# Patient Record
Sex: MALE | Race: WHITE | NOT HISPANIC OR LATINO | Employment: OTHER | URBAN - METROPOLITAN AREA
[De-identification: names, ages, dates, MRNs, and addresses within clinical notes are randomized per-mention and may not be internally consistent; named-entity substitution may affect disease eponyms.]

---

## 2017-10-03 ENCOUNTER — OFFICE VISIT (OUTPATIENT)
Dept: URGENT CARE | Facility: CLINIC | Age: 72
End: 2017-10-03
Payer: MEDICARE

## 2017-10-03 PROCEDURE — G0463 HOSPITAL OUTPT CLINIC VISIT: HCPCS

## 2017-10-03 PROCEDURE — 99213 OFFICE O/P EST LOW 20 MIN: CPT

## 2017-10-05 NOTE — PROGRESS NOTES
Assessment  1  Sciatica of right side (724 3) (M54 31)    Plan  Sciatica of right side    · Baclofen 10 MG Oral Tablet; TAKE 1 TABLET 3 TIMES DAILY AS NEEDED FOR  MUSCLE SPASM   · PredniSONE 10 MG Oral Tablet; 3 tabs BID X 2 days, 2 Tabs BID X 2 days, 1 Tab  BID X 2 Days, then 1 Tab daily X 2 days    Discussion/Summary  Discussion Summary:   Start prednisone and baclofen as needed  If symptoms are not improving over the next week, follow up with PCP  Medication Side Effects Reviewed: Possible side effects of new medications were reviewed with the patient/guardian today  Understands and agrees with treatment plan: The treatment plan was reviewed with the patient/guardian  The patient/guardian understands and agrees with the treatment plan   Follow Up Instructions: Follow Up with your Primary Care Provider in 7 days  If your symptoms worsen, go to the nearest [place] [address] Emergency Department  Chief Complaint  1  Back Pain  Chief Complaint Free Text Note Form: C/O right low back pain which he describes as shooting and radiates into his right leg x 3 days  Pt denies any known injury  History of Present Illness  HPI: 80year old male here with right lower back pain that radiates into his right hip and buttocks  Denies any injury  Denies weakness or numbness in his leg  Denies bowel or bladder symptoms  No Fever or chills  Hospital Based Practices Required Assessment:   Pain Assessment   the patient states they have pain  The pain is located in the right low back  The pain radiates to the right leg  The patient describes the pain as sharp and shooting  (on a scale of 0 to 10, the patient rates the pain at 7 )   Abuse And Domestic Violence Screen    Yes, the patient is safe at home -The patient states no one is hurting them  Depression And Suicide Screen  No, the patient has not had thoughts of hurting themself  No, the patient has not felt depressed in the past 7 days     Back Pain: RUTH ANN GUIDRY presents with complaints of back pain  Associated symptoms include spasm, but-no stiffness,-no fever,-no night sweats,-no abdominal pain,-no general malaise,-no weight loss,-no arm numbness,-no leg numbness,-no arm weakness,-no leg weakness,-no urinary incontinence,-no fecal incontinence,-no urinary retention-and-no rash localized to the area of pain  Review of Systems  Focused-Male:   Constitutional: no fever or chills, feels well, no tiredness, no recent weight loss or weight gain  Respiratory: no complaints of shortness of breath, no wheezing or cough, no dyspnea on exertion, no orthopnea or PND  Gastrointestinal: no complaints of abdominal pain, no constipation, no nausea or vomiting, no diarrhea or bloody stools  Genitourinary: no complaints of dysuria or incontinence, no hesitancy, no nocturia, no genital lesion, no inadequacy of penile erection  Musculoskeletal: as noted in HPI  Neurological: no complaints of headache, no confusion, no numbness or tingling, no dizziness or fainting  ROS Reviewed:   ROS reviewed  Active Problems  1  Epistaxis (784 7) (R04 0)   2  Heart disease (429 9) (I51 9)   3  Hyperlipidemia (272 4) (E78 5)   4  Hypertension (401 9) (I10)    Past Medical History  Active Problems And Past Medical History Reviewed: The active problems and past medical history were reviewed and updated today  Family History  Family History Reviewed: The family history was reviewed and updated today  Social History  Social History Reviewed: The social history was reviewed and updated today  The social history was reviewed and is unchanged  Surgical History  1  History of Heart Surgery  Surgical History Reviewed: The surgical history was reviewed and updated today  Current Meds   1  ALPRAZolam 0 25 MG Oral Tablet; Therapy: 45WVN8227 to (Last Rx:13Mar2012)  Requested for: 84KXW2568 Ordered   2  Aspirin 81 MG TABS;    Therapy: (Recorded:22Nov2014) to Recorded 3  Chantix Continuing Month Tyler 1 MG Oral Tablet; Therapy: 48TJW6578 to (Last Rx:09Jan2012)  Requested for: 32DZI5129 Ordered   4  Crestor TABS; Therapy: (([phone]) to Recorded   5  Isosorbide Mononitrate ER 30 MG Oral Tablet Extended Release 24 Hour; Therapy: 80SUY8175 to (Last Rx:16Mar2012)  Requested for: 26AUZ4925 Ordered   6  Lisinopril 5 MG Oral Tablet; Therapy: 70SNE9872 to (Last Rx:13Mar2012)  Requested for: 22CDM8797 Ordered   7  Meloxicam 15 MG Oral Tablet; Therapy: 34QCY6414 to (Last Rx:13Mar2012)  Requested for: 59CGD5984 Ordered   8  Metoprolol Tartrate 50 MG Oral Tablet; Therapy: 09ONB0574 to (Last Rx:13Mar2012)  Requested for: 16NZT3807 Ordered   9  Plavix 75 MG Oral Tablet; Therapy: 75JLV5947 to (Last Rx:13Mar2012)  Requested for: 66KGX7207 Ordered   10  Voltaren 1 % Transdermal Gel; Therapy: 41GNT4253 to (Last Rx:12Oct2011)  Requested for: 29Gbu4692 Ordered   11  Zetia 10 MG Oral Tablet; Therapy: 35YRP1552 to (Last Rx:13Mar2012)  Requested for: 89NWD9168 Ordered  Medication List Reviewed: The medication list was reviewed and updated today  Allergies  1  No Known Drug Allergies    Vitals  Signs   Recorded: 32DHM0437 11:55AM   Temperature: 95 9 F  Heart Rate: 77  Respiration: 18  Systolic: 955  Diastolic: 82  O2 Saturation: 96  Pain Scale: 7    Physical Exam    Constitutional   General appearance: No acute distress, well appearing and well nourished  Musculoskeletal   Gait and station: Normal     Inspection/palpation of joints, bones, and muscles: Abnormal  -lumbar spine with limited ROM of lower back, tender to palpation right paraspinal muscles  Good strength B/L LE 5/5, DTRs +2 B/L patella and ankle, sensation intact        Signatures   Electronically signed by : Cirilo Hobbs, [facility]; Oct  3 2017 12:10PM EST                       (Author)    Electronically signed by : JOSE Dobson ; Oct  4 2017 12:31PM EST                       (Co-author)

## 2019-06-12 ENCOUNTER — APPOINTMENT (OUTPATIENT)
Dept: LAB | Facility: CLINIC | Age: 74
End: 2019-06-12
Payer: MEDICARE

## 2019-06-12 ENCOUNTER — TRANSCRIBE ORDERS (OUTPATIENT)
Dept: URGENT CARE | Facility: CLINIC | Age: 74
End: 2019-06-12

## 2019-06-12 DIAGNOSIS — Z12.5 SCREENING FOR PROSTATE CANCER: ICD-10-CM

## 2019-06-12 DIAGNOSIS — E55.9 VITAMIN D DEFICIENCY: ICD-10-CM

## 2019-06-12 DIAGNOSIS — E78.5 HYPERLIPIDEMIA, UNSPECIFIED HYPERLIPIDEMIA TYPE: Primary | ICD-10-CM

## 2019-06-12 DIAGNOSIS — I10 ESSENTIAL HYPERTENSION: ICD-10-CM

## 2019-06-12 LAB
25(OH)D3 SERPL-MCNC: 25.2 NG/ML (ref 30–100)
ALBUMIN SERPL BCP-MCNC: 4.1 G/DL (ref 3.5–5)
ALP SERPL-CCNC: 86 U/L (ref 46–116)
ALT SERPL W P-5'-P-CCNC: 22 U/L (ref 12–78)
ANION GAP SERPL CALCULATED.3IONS-SCNC: 4 MMOL/L (ref 4–13)
AST SERPL W P-5'-P-CCNC: 16 U/L (ref 5–45)
BASOPHILS # BLD AUTO: 0.03 THOUSANDS/ΜL (ref 0–0.1)
BASOPHILS NFR BLD AUTO: 0 % (ref 0–1)
BILIRUB SERPL-MCNC: 0.68 MG/DL (ref 0.2–1)
BILIRUB UR QL STRIP: NEGATIVE
BUN SERPL-MCNC: 20 MG/DL (ref 5–25)
CALCIUM SERPL-MCNC: 8.8 MG/DL (ref 8.3–10.1)
CHLORIDE SERPL-SCNC: 105 MMOL/L (ref 100–108)
CHOLEST SERPL-MCNC: 135 MG/DL (ref 50–200)
CLARITY UR: CLEAR
CO2 SERPL-SCNC: 26 MMOL/L (ref 21–32)
COLOR UR: YELLOW
CREAT SERPL-MCNC: 1.11 MG/DL (ref 0.6–1.3)
CREAT UR-MCNC: 118 MG/DL
EOSINOPHIL # BLD AUTO: 0.1 THOUSAND/ΜL (ref 0–0.61)
EOSINOPHIL NFR BLD AUTO: 1 % (ref 0–6)
ERYTHROCYTE [DISTWIDTH] IN BLOOD BY AUTOMATED COUNT: 12.9 % (ref 11.6–15.1)
GFR SERPL CREATININE-BSD FRML MDRD: 66 ML/MIN/1.73SQ M
GLUCOSE SERPL-MCNC: 95 MG/DL (ref 65–140)
GLUCOSE UR STRIP-MCNC: NEGATIVE MG/DL
HCT VFR BLD AUTO: 47.8 % (ref 36.5–49.3)
HDLC SERPL-MCNC: 31 MG/DL (ref 40–60)
HGB BLD-MCNC: 15.9 G/DL (ref 12–17)
HGB UR QL STRIP.AUTO: NEGATIVE
IMM GRANULOCYTES # BLD AUTO: 0.04 THOUSAND/UL (ref 0–0.2)
IMM GRANULOCYTES NFR BLD AUTO: 1 % (ref 0–2)
KETONES UR STRIP-MCNC: NEGATIVE MG/DL
LDLC SERPL CALC-MCNC: 71 MG/DL (ref 0–100)
LEUKOCYTE ESTERASE UR QL STRIP: NEGATIVE
LYMPHOCYTES # BLD AUTO: 1.71 THOUSANDS/ΜL (ref 0.6–4.47)
LYMPHOCYTES NFR BLD AUTO: 23 % (ref 14–44)
MCH RBC QN AUTO: 32.1 PG (ref 26.8–34.3)
MCHC RBC AUTO-ENTMCNC: 33.3 G/DL (ref 31.4–37.4)
MCV RBC AUTO: 97 FL (ref 82–98)
MICROALBUMIN UR-MCNC: 6.7 MG/L (ref 0–20)
MICROALBUMIN/CREAT 24H UR: 6 MG/G CREATININE (ref 0–30)
MONOCYTES # BLD AUTO: 0.87 THOUSAND/ΜL (ref 0.17–1.22)
MONOCYTES NFR BLD AUTO: 12 % (ref 4–12)
NEUTROPHILS # BLD AUTO: 4.81 THOUSANDS/ΜL (ref 1.85–7.62)
NEUTS SEG NFR BLD AUTO: 63 % (ref 43–75)
NITRITE UR QL STRIP: NEGATIVE
NONHDLC SERPL-MCNC: 104 MG/DL
NRBC BLD AUTO-RTO: 0 /100 WBCS
PH UR STRIP.AUTO: 6.5 [PH]
PLATELET # BLD AUTO: 150 THOUSANDS/UL (ref 149–390)
PMV BLD AUTO: 13.1 FL (ref 8.9–12.7)
POTASSIUM SERPL-SCNC: 4.2 MMOL/L (ref 3.5–5.3)
PROT SERPL-MCNC: 8.1 G/DL (ref 6.4–8.2)
PROT UR STRIP-MCNC: NEGATIVE MG/DL
RBC # BLD AUTO: 4.95 MILLION/UL (ref 3.88–5.62)
SODIUM SERPL-SCNC: 135 MMOL/L (ref 136–145)
SP GR UR STRIP.AUTO: 1.02 (ref 1–1.03)
TRIGL SERPL-MCNC: 163 MG/DL
UROBILINOGEN UR QL STRIP.AUTO: 1 E.U./DL
WBC # BLD AUTO: 7.56 THOUSAND/UL (ref 4.31–10.16)

## 2019-06-12 PROCEDURE — 80061 LIPID PANEL: CPT

## 2019-06-12 PROCEDURE — 85025 COMPLETE CBC W/AUTO DIFF WBC: CPT

## 2019-06-12 PROCEDURE — 82043 UR ALBUMIN QUANTITATIVE: CPT

## 2019-06-12 PROCEDURE — 82570 ASSAY OF URINE CREATININE: CPT

## 2019-06-12 PROCEDURE — 36415 COLL VENOUS BLD VENIPUNCTURE: CPT

## 2019-06-12 PROCEDURE — 80053 COMPREHEN METABOLIC PANEL: CPT

## 2019-06-12 PROCEDURE — 81003 URINALYSIS AUTO W/O SCOPE: CPT

## 2019-06-12 PROCEDURE — 82306 VITAMIN D 25 HYDROXY: CPT

## 2019-06-12 PROCEDURE — G0103 PSA SCREENING: HCPCS

## 2019-06-13 LAB
PSA FREE MFR SERPL: 15.9 %
PSA FREE SERPL-MCNC: 0.27 NG/ML
PSA SERPL-MCNC: 1.7 NG/ML (ref 0–4)

## 2019-09-18 ENCOUNTER — APPOINTMENT (OUTPATIENT)
Dept: RADIOLOGY | Facility: CLINIC | Age: 74
End: 2019-09-18
Payer: MEDICARE

## 2019-09-18 ENCOUNTER — OFFICE VISIT (OUTPATIENT)
Dept: NEPHROLOGY | Facility: CLINIC | Age: 74
End: 2019-09-18
Payer: MEDICARE

## 2019-09-18 VITALS
BODY MASS INDEX: 29.09 KG/M2 | HEART RATE: 76 BPM | SYSTOLIC BLOOD PRESSURE: 140 MMHG | WEIGHT: 181 LBS | DIASTOLIC BLOOD PRESSURE: 82 MMHG | HEIGHT: 66 IN

## 2019-09-18 DIAGNOSIS — M25.512 LEFT SHOULDER PAIN, UNSPECIFIED CHRONICITY: Primary | ICD-10-CM

## 2019-09-18 DIAGNOSIS — M25.512 LEFT SHOULDER PAIN, UNSPECIFIED CHRONICITY: ICD-10-CM

## 2019-09-18 PROCEDURE — 99213 OFFICE O/P EST LOW 20 MIN: CPT | Performed by: INTERNAL MEDICINE

## 2019-09-18 PROCEDURE — 73030 X-RAY EXAM OF SHOULDER: CPT

## 2019-09-18 RX ORDER — ISOSORBIDE MONONITRATE 30 MG/1
TABLET, EXTENDED RELEASE ORAL
COMMUNITY
Start: 2012-03-16

## 2019-09-18 RX ORDER — ALPRAZOLAM 0.25 MG/1
TABLET ORAL
COMMUNITY
Start: 2012-03-13

## 2019-09-18 RX ORDER — TRAMADOL HYDROCHLORIDE 50 MG/1
50 TABLET ORAL
Qty: 30 TABLET | Refills: 0 | Status: SHIPPED | OUTPATIENT
Start: 2019-09-18

## 2019-09-18 RX ORDER — LISINOPRIL 10 MG/1
TABLET ORAL
COMMUNITY
Start: 2019-08-29 | End: 2019-11-25 | Stop reason: SDUPTHER

## 2019-09-18 RX ORDER — EZETIMIBE 10 MG/1
TABLET ORAL
COMMUNITY
Start: 2012-03-13

## 2019-09-18 RX ORDER — CLOPIDOGREL BISULFATE 75 MG/1
TABLET ORAL
COMMUNITY
Start: 2012-03-13 | End: 2019-11-25 | Stop reason: SDUPTHER

## 2019-09-18 NOTE — PATIENT INSTRUCTIONS
Use ice or heat  Have an xray  Take tramadol 50mg with 2 extra strength tylenol at bedtime for pain  See Dr Du Loud

## 2019-09-18 NOTE — PROGRESS NOTES
[address] [facility], [place]    Name: Joseph Jackson  YOB: 1945      Assessment/Plan:    No problem-specific Assessment & Plan notes found for this encounter  Problem List Items Addressed This Visit        Other    Shoulder pain, left - Primary    Relevant Medications    traMADol (ULTRAM) 50 mg tablet    Other Relevant Orders    XR shoulder 2+ vw left    Ambulatory referral to Orthopedic Surgery            Subjective:      Patient ID: Joseph Jackson is a 68 y o  male  HPI  He fell out of bed and injured his left shoulder  He did not go to [facility]  This happened one month ago and it is not improving  He can't lift his left arm more than 30- degrees without pain  He can flex behind his back He can touch the top of his head with forcing  He tried heat, ice and both  He can't sleep as the pain is worse at night  He gets a heating pain and aches all over  To lower arm and the left chest   He is able to hdo activities but he hears a clicking sound  He just tried Aleve q 12 h and Blue Emu with minimal relief  The following portions of the patient's history were reviewed and updated as appropriate: allergies, current medications, past family history, past medical history, past social history, past surgical history and problem list     Review of Systems   All other systems reviewed and are negative          Social History     Socioeconomic History    Marital status: /Civil Union     Spouse name: None    Number of children: None    Years of education: None    Highest education level: None   Occupational History    None   Social Needs    Financial resource strain: None    Food insecurity:     Worry: None     Inability: None    Transportation needs:     Medical: None     Non-medical: None   Tobacco Use    Smoking status: Former Smoker    Smokeless tobacco: Never Used   Substance and Sexual Activity    Alcohol use: Never     Frequency: Never    Drug use: Never    Sexual activity: None   Lifestyle    Physical activity:     Days per week: None     Minutes per session: None    Stress: None   Relationships    Social connections:     Talks on phone: None     Gets together: None     Attends Religion service: None     Active member of club or organization: None     Attends meetings of clubs or organizations: None     Relationship status: None    Intimate partner violence:     Fear of current or ex partner: None     Emotionally abused: None     Physically abused: None     Forced sexual activity: None   Other Topics Concern    None   Social History Narrative    None     Past Medical History:   Diagnosis Date    Chronic ischemic heart disease     COPD (chronic obstructive pulmonary disease) ([facility] [address] )     Hyperlipemia     Hypertension     Osteoarthritis      Past Surgical History:   Procedure Laterality Date    ANGIOPLASTY      APPENDECTOMY         Current Outpatient Medications:     ALPRAZolam (XANAX) 0 25 mg tablet, Take by mouth, Disp: , Rfl:     clopidogrel (PLAVIX) 75 mg tablet, Take by mouth, Disp: , Rfl:     diclofenac sodium (VOLTAREN) 1 %, Place on the skin, Disp: , Rfl:     ezetimibe (ZETIA) 10 mg tablet, Take by mouth, Disp: , Rfl:     isosorbide mononitrate (IMDUR) 30 mg 24 hr tablet, Take by mouth, Disp: , Rfl:     lisinopril (ZESTRIL) 10 mg tablet, , Disp: , Rfl:     traMADol (ULTRAM) 50 mg tablet, Take 1 tablet (50 mg total) by mouth daily at bedtime, Disp: 30 tablet, Rfl: 0     Lab Results   Component Value Date    SODIUM 135 (L) 06/12/2019    K 4 2 06/12/2019     06/12/2019    CO2 26 06/12/2019    AGAP 4 06/12/2019    BUN 20 06/12/2019    CREATININE 1 11 06/12/2019    GLUC 95 06/12/2019    CALCIUM 8 8 06/12/2019    AST 16 06/12/2019    ALT 22 06/12/2019    ALKPHOS 86 06/12/2019    TP 8 1 06/12/2019    TBILI 0 68 06/12/2019    EGFR 66 06/12/2019     Lab Results   Component Value Date    WBC 7 56 06/12/2019    HGB 15 9 06/12/2019    HCT 47 8 06/12/2019    MCV 97 06/12/2019     06/12/2019     Lab Results   Component Value Date    CHOLESTEROL 135 06/12/2019     Lab Results   Component Value Date    HDL 31 (L) 06/12/2019     Lab Results   Component Value Date    LDLCALC 71 06/12/2019     Lab Results   Component Value Date    TRIG 163 (H) 06/12/2019     No results found for: CHOLHDL  No results found for: UAA7EZURAPDD, TSH        Objective:      /82 (BP Location: Right arm, Patient Position: Sitting, Cuff Size: Standard)   Pulse 76   Ht 5' 6" (1 676 m)   Wt 82 1 kg (181 lb)   BMI 29 21 kg/m²          Physical Exam   Constitutional: He appears well-developed and well-nourished  HENT:   Head: Normocephalic  Cardiovascular: Normal rate and regular rhythm  Pulmonary/Chest: Breath sounds normal    Abdominal: Soft  Bowel sounds are normal  A hernia is present  Musculoskeletal: He exhibits tenderness     Decreased ROM of the left arm with tenderness over acromioclavicular joint

## 2019-09-20 ENCOUNTER — TELEPHONE (OUTPATIENT)
Dept: NEPHROLOGY | Facility: CLINIC | Age: 74
End: 2019-09-20

## 2019-09-20 NOTE — TELEPHONE ENCOUNTER
----- Message from Luisa Paulson MD sent at 9/18/2019  4:13 PM EDT -----  Shoulder - no fracture  See ortho

## 2019-09-30 ENCOUNTER — EVALUATION (OUTPATIENT)
Dept: PHYSICAL THERAPY | Facility: CLINIC | Age: 74
End: 2019-09-30
Payer: MEDICARE

## 2019-09-30 VITALS — DIASTOLIC BLOOD PRESSURE: 87 MMHG | SYSTOLIC BLOOD PRESSURE: 146 MMHG

## 2019-09-30 DIAGNOSIS — M25.512 ACUTE PAIN OF LEFT SHOULDER: Primary | ICD-10-CM

## 2019-09-30 PROCEDURE — 97162 PT EVAL MOD COMPLEX 30 MIN: CPT | Performed by: PHYSICAL THERAPIST

## 2019-09-30 NOTE — LETTER
2019    [facility] [address] [place] [id number]    Patient: Aramis Welch   YOB: 1945   Date of Visit: 2019     Encounter Diagnosis     ICD-10-CM    1  Acute pain of left shoulder M25 512        Dear Dr Lopez Shape:    Thank you for your recent referral of Aramis Welch  Please review the attached evaluation summary from Omero's recent visit  Please verify that you agree with the plan of care by signing the attached order  If you have any questions or concerns, please do not hesitate to call  I sincerely appreciate the opportunity to share in the care of one of your patients and hope to have another opportunity to work with you in the near future  Sincerely,    Ramya Root PT      Referring Provider:      I certify that I have read the below Plan of Care and certify the need for these services furnished under this plan of treatment while under my care  Carl Felix MD  [facility] [address]  [address] [facility] [place]          PT Evaluation     Today's date: 2019  Patient name: Aramis Welch  : 1945  MRN: [id number]  Referring provider: Lakshmi Tiwari MD  Dx:   Encounter Diagnosis     ICD-10-CM    1  Acute pain of left shoulder M25 512                   Assessment  Assessment details: Aramis Welch is a 68 y o  male who presents with complaints of  acute pain of left shoulder  No further referral appears necessary at this time based upon examination results  Patient presents with s/s consistent with tendonitis of L RC  Currently presents with limited strength and ROM L UE  Prognosis is good given HEP compliance and PT 2x/wk  Please contact me if you have any questions or recommendations  Thank you for the opportunity to share in  Omero's care       Impairments: abnormal or restricted ROM, activity intolerance, impaired physical strength, lacks appropriate home exercise program, pain with function and poor posture   Understanding of Dx/Px/POC: good   Prognosis: good    Goals  1) In 4 weeks, patient will demonstrate improved L shoulder AROM 10 deg or more flex and abd  2) In 4 weeks, patient will report decreased pain level 3 points or more on 10 points scale  3) In 4 weeks, patient will demonstrate improved L shoulder IR/ER AROM 2 vertebral levels or more  4) In 4 weeks, patient will demonstrate independence with initial HEP  5) In 4 weeks, patient will report improved uninterrupted sleep 25% or better  Plan  Patient would benefit from: skilled physical therapy  Referral necessary: No  Planned modality interventions: cryotherapy, thermotherapy: hydrocollator packs, ultrasound and unattended electrical stimulation  Planned therapy interventions: joint mobilization, manual therapy, massage, Edwards taping, neuromuscular re-education, postural training, patient education, stretching, strengthening, therapeutic exercise, home exercise program, functional ROM exercises and flexibility  Frequency: 2x week  Duration in weeks: 4  Treatment plan discussed with: patient        Subjective Evaluation    History of Present Illness  Date of onset: 2019  Mechanism of injury: Patient reports he began having L shoulder pain after falling out of bed about 2 months ago  He thinks he was laying on the edge of his bed when he tried to get up, rolled off and hip his L UE off of the ground, "jarring" the L shoulder  Patient reports he was bruised up through his upper arm  He has been using ice and heat  Was prescribed Tramadol but does not notice improvement when taking this medication  Patient had x-ray imaging which was negative for fracture  No injections at this time  Pain is located along L side of neck into shoulder and upper arm but not distal to elbow  Denies N/T in UE     Pain  Current pain rating: 3  At best pain rating: 3  At worst pain ratin  Quality: throbbing and dull ache  Relieving factors: rest  Aggravating factors: overhead activity and lifting    Social Support    Employment status: not working  Hand dominance: right      Diagnostic Tests  X-ray: normal  Treatments  Current treatment: medication  Patient Goals  Patient goals for therapy: decreased pain          Objective     Cervical/Thoracic Screen   Cervical range of motion within normal limits    Active Range of Motion   Left Shoulder   Flexion: 135 degrees with pain  Extension: 50 degrees   Abduction: 75 degrees with pain  External rotation BTH: C2 with pain  Internal rotation BTB: sacrum with pain    Passive Range of Motion   Left Shoulder   Flexion: 130 degrees with pain  Abduction: 110 degrees with pain  External rotation 90°:  65 degrees with pain  Internal rotation 90°:  30 degrees with pain    Strength/Myotome Testing     Left Shoulder     Planes of Motion   Flexion: 3-   Extension: 4+   Abduction: 3-   External rotation at 0°:  3+     Right Shoulder     Planes of Motion   Internal rotation at 0°:  4     Isolated Muscles   Biceps: 5   Triceps: 5     Additional Strength Details  Pain reproduced with MMT flexion, abduction, ER    Tests     Left Shoulder   Positive drop arm, empty can, Hawkin's, Ev/Ascencio, passive horizontal adduction and Speed's  Negative anterior load and shift, belly press, external rotation lag sign, Neer's, painful arc, Eder's sign, posterior load and shift and bicep load   Right Shoulder   Positive drop arm, empty can, Hawkin's, Ev/Ascencio, passive horizontal adduction and Speed's  Negative belly press, external rotation lag sign, Neer's, painful arc, Eder's sign and sulcus sign         Flowsheet Rows      Most Recent Value   PT/OT G-Codes   Current Score  64   Projected Score  72             Precautions: PMH significant for ischemic heart disease, angioplasty, HTN and high cholesterol  Re-eval due 10/28  Manual         L shoulder PROM to jory                                            Exercise Diary UBE        Pulleys - flex/scap        Finger ladder - flex/scap        Supine cane AAROM flex        Supine cane AAROM scap        Pendulums - all directions        Shoulder isometrics -submax -  flex/ext/IR/ER        Doorway pec stretch        TB MTP        TB LTP                                                                                            Modalities         CP to L shoulder post tx

## 2019-09-30 NOTE — PROGRESS NOTES
PT Evaluation     Today's date: 2019  Patient name: Joseph Jackson  : 1945  MRN: [id number]  Referring provider: Carmen Beck MD  Dx:   Encounter Diagnosis     ICD-10-CM    1  Acute pain of left shoulder M25 512                   Assessment  Assessment details: Joseph Jackson is a 68 y o  male who presents with complaints of  acute pain of left shoulder  No further referral appears necessary at this time based upon examination results  Patient presents with s/s consistent with tendonitis of L RC  Currently presents with limited strength and ROM L UE  Prognosis is good given HEP compliance and PT 2x/wk  Please contact me if you have any questions or recommendations  Thank you for the opportunity to share in  Omero's care  Impairments: abnormal or restricted ROM, activity intolerance, impaired physical strength, lacks appropriate home exercise program, pain with function and poor posture   Understanding of Dx/Px/POC: good   Prognosis: good    Goals  1) In 4 weeks, patient will demonstrate improved L shoulder AROM 10 deg or more flex and abd  2) In 4 weeks, patient will report decreased pain level 3 points or more on 10 points scale  3) In 4 weeks, patient will demonstrate improved L shoulder IR/ER AROM 2 vertebral levels or more  4) In 4 weeks, patient will demonstrate independence with initial HEP  5) In 4 weeks, patient will report improved uninterrupted sleep 25% or better      Plan  Patient would benefit from: skilled physical therapy  Referral necessary: No  Planned modality interventions: cryotherapy, thermotherapy: hydrocollator packs, ultrasound and unattended electrical stimulation  Planned therapy interventions: joint mobilization, manual therapy, massage, Edwards taping, neuromuscular re-education, postural training, patient education, stretching, strengthening, therapeutic exercise, home exercise program, functional ROM exercises and flexibility  Frequency: 2x week  Duration in weeks: 4  Treatment plan discussed with: patient        Subjective Evaluation    History of Present Illness  Date of onset: 2019  Mechanism of injury: Patient reports he began having L shoulder pain after falling out of bed about 2 months ago  He thinks he was laying on the edge of his bed when he tried to get up, rolled off and hip his L UE off of the ground, "jarring" the L shoulder  Patient reports he was bruised up through his upper arm  He has been using ice and heat  Was prescribed Tramadol but does not notice improvement when taking this medication  Patient had x-ray imaging which was negative for fracture  No injections at this time  Pain is located along L side of neck into shoulder and upper arm but not distal to elbow  Denies N/T in UE     Pain  Current pain rating: 3  At best pain rating: 3  At worst pain ratin  Quality: throbbing and dull ache  Relieving factors: rest  Aggravating factors: overhead activity and lifting    Social Support    Employment status: not working  Hand dominance: right      Diagnostic Tests  X-ray: normal  Treatments  Current treatment: medication  Patient Goals  Patient goals for therapy: decreased pain          Objective     Cervical/Thoracic Screen   Cervical range of motion within normal limits    Active Range of Motion   Left Shoulder   Flexion: 135 degrees with pain  Extension: 50 degrees   Abduction: 75 degrees with pain  External rotation BTH: C2 with pain  Internal rotation BTB: sacrum with pain    Passive Range of Motion   Left Shoulder   Flexion: 130 degrees with pain  Abduction: 110 degrees with pain  External rotation 90°: 65 degrees with pain  Internal rotation 90°: 30 degrees with pain    Strength/Myotome Testing     Left Shoulder     Planes of Motion   Flexion: 3-   Extension: 4+   Abduction: 3-   External rotation at 0°: 3+     Right Shoulder     Planes of Motion   Internal rotation at 0°: 4     Isolated Muscles   Biceps: 5   Triceps: 5     Additional Strength Details  Pain reproduced with MMT flexion, abduction, ER    Tests     Left Shoulder   Positive drop arm, empty can, Hawkin's, Ev/Ascencio, passive horizontal adduction and Speed's  Negative anterior load and shift, belly press, external rotation lag sign, Neer's, painful arc, Eder's sign, posterior load and shift and bicep load   Right Shoulder   Positive drop arm, empty can, Hawkin's, Ev/Ascencio, passive horizontal adduction and Speed's  Negative belly press, external rotation lag sign, Neer's, painful arc, Eder's sign and sulcus sign         Flowsheet Rows      Most Recent Value   PT/OT G-Codes   Current Score  64   Projected Score  72             Precautions: PMH significant for ischemic heart disease, angioplasty, HTN and high cholesterol  Re-eval due 10/28  Manual         L shoulder PROM to jory                                            Exercise Diary         UBE        Pulleys - flex/scap        Finger ladder - flex/scap        Supine cane AAROM flex        Supine cane AAROM scap        Pendulums - all directions        Shoulder isometrics -submax -  flex/ext/IR/ER        Doorway pec stretch        TB MTP        TB LTP                                                                                            Modalities         CP to L shoulder post tx

## 2019-10-02 ENCOUNTER — OFFICE VISIT (OUTPATIENT)
Dept: PHYSICAL THERAPY | Facility: CLINIC | Age: 74
End: 2019-10-02
Payer: MEDICARE

## 2019-10-02 DIAGNOSIS — M25.512 ACUTE PAIN OF LEFT SHOULDER: Primary | ICD-10-CM

## 2019-10-02 PROCEDURE — 97140 MANUAL THERAPY 1/> REGIONS: CPT

## 2019-10-02 PROCEDURE — 97110 THERAPEUTIC EXERCISES: CPT

## 2019-10-02 NOTE — PROGRESS NOTES
Daily Note     Today's date: 10/2/2019  Patient name: Jeimy Valentin  : 1945  MRN: [id number]  Referring provider: Emmett Ohara MD  Dx:   Encounter Diagnosis     ICD-10-CM    1  Acute pain of left shoulder M25 512                   Subjective: "I have a hard time lifting my arm to brush my teeth or put deodorant on "       Objective: See treatment diary below      Assessment: Tolerated treatment fair  Initiated multiple exercises as outlined in pt POC  Pt PROM of the L shoulder limited by pain this date  Patient demonstrated fatigue post treatment and would benefit from continued PT      Plan: Continue per plan of care        Precautions: PMH significant for ischemic heart disease, angioplasty, HTN and high cholesterol  Re-eval due 10/28  Manual  10/2       L shoulder PROM to jory x                                15 min           Exercise Diary  10/2        rpm 5' fwd/ 5' retro       Pulleys - flex/scap 10" x10 ea       Finger ladder - flex/scap 10" x10 ea       Supine cane AAROM flex 10" x10        Supine cane AAROM scap        Pendulums - all directions        Shoulder isometrics -submax -  flex/ext/IR/ER 5" x10 ea       Doorway pec stretch 10"x10       TB MTP Red Tb 5" x20       TB LTP Red Tb 5" x20                                                                                           Modalities  10/2       CP to L shoulder post tx Defers

## 2019-10-08 ENCOUNTER — OFFICE VISIT (OUTPATIENT)
Dept: PHYSICAL THERAPY | Facility: CLINIC | Age: 74
End: 2019-10-08
Payer: MEDICARE

## 2019-10-08 DIAGNOSIS — M25.512 ACUTE PAIN OF LEFT SHOULDER: Primary | ICD-10-CM

## 2019-10-08 PROCEDURE — 97110 THERAPEUTIC EXERCISES: CPT | Performed by: PHYSICAL THERAPIST

## 2019-10-08 PROCEDURE — 97140 MANUAL THERAPY 1/> REGIONS: CPT | Performed by: PHYSICAL THERAPIST

## 2019-10-08 NOTE — PROGRESS NOTES
Daily Note     Today's date: 10/8/2019  Patient name: Marzena Mckeon  : 1945  MRN: [id number]  Referring provider: Marcie Jang MD  Dx:   Encounter Diagnosis     ICD-10-CM    1  Acute pain of left shoulder M25 512                   Subjective: Patient reports he "liked the workout" from the last session  Reports feeling a little better after his treatment  Objective: See treatment diary below      Assessment: Tolerated treatment well  Added supine cane scaption with good tolerance  Patient demonstrated fatigue post treatment, exhibited good technique with therapeutic exercises and would benefit from continued PT      Plan: Continue per plan of care  Progress treatment as tolerated         Precautions: PMH significant for ischemic heart disease, angioplasty, HTN and high cholesterol  Re-eval due 10/28  Manual  10/2 10/8      L shoulder PROM to jory x x                               15 min 15 mins          Exercise Diary  10/2 10/8       rpm 5' fwd/ 5' retro 120 rpm 5' fwd/ 5' retro      Pulleys - flex/scap 10" x10 ea 10" x10 ea      Finger ladder - flex/scap 10" x10 ea 10" x10 ea      Supine cane AAROM flex 10" x10  10" x10 ea      Supine cane AAROM scap  10" x10 ea      Pendulums - all directions        Shoulder isometrics -submax -  flex/ext/IR/ER 5" x10 ea 5" x10 ea      Doorway pec stretch 10"x10 10"x10      TB MTP Red Tb 5" x20 Red Tb 5" x20      TB LTP Red Tb 5" x20 Red Tb 5" x20                                                                                          Modalities  10/2 10/8      CP to L shoulder post tx Defers Defers

## 2019-10-11 ENCOUNTER — OFFICE VISIT (OUTPATIENT)
Dept: PHYSICAL THERAPY | Facility: CLINIC | Age: 74
End: 2019-10-11
Payer: MEDICARE

## 2019-10-11 DIAGNOSIS — M25.512 ACUTE PAIN OF LEFT SHOULDER: Primary | ICD-10-CM

## 2019-10-11 PROCEDURE — 97140 MANUAL THERAPY 1/> REGIONS: CPT | Performed by: PHYSICAL THERAPIST

## 2019-10-11 PROCEDURE — 97110 THERAPEUTIC EXERCISES: CPT | Performed by: PHYSICAL THERAPIST

## 2019-10-11 NOTE — PROGRESS NOTES
Daily Note     Today's date: 10/11/2019  Patient name: Leigh Up  : 1945  MRN: [id number]  Referring provider: Doris Navarro MD  Dx:   Encounter Diagnosis     ICD-10-CM    1  Acute pain of left shoulder M25 512                   Subjective: "I can actually lift the arm up to get deodorant on now  It's getting better "      Objective: See treatment diary below      Assessment: Tolerated treatment well  Requires cueing for all exercises to recall proper technique  Patient demonstrated fatigue post treatment, exhibited good technique with therapeutic exercises and would benefit from continued PT      Plan: Continue per plan of care  Progress treatment as tolerated         Precautions: PMH significant for ischemic heart disease, angioplasty, HTN and high cholesterol  Re-eval due 10/28  Manual  10/2 10/8 10/11     L shoulder PROM to jory x x x                              15 min 15 mins 10 mins         Exercise Diary  10/2 10/8 10/11      rpm 5' fwd/ 5' retro 120 rpm 5' fwd/ 5' retro 120 rpm 5' fwd/ 5' retro     Pulleys - flex/scap 10" x10 ea 10" x10 ea 10" x10 ea     Finger ladder - flex/scap 10" x10 ea 10" x10 ea 10"x10 ea     Supine cane AAROM flex 10" x10  10" x10 ea 10"x10      Supine cane AAROM scap  10" x10 ea 10" x10     Pendulums - all directions        Shoulder isometrics -submax -  flex/ext/IR/ER 5" x10 ea 5" x10 ea 5" x10     Doorway pec stretch 10"x10 10"x10 30"x4     TB MTP Red Tb 5" x20 Red Tb 5" x20 Red Tb 5" x20     TB LTP Red Tb 5" x20 Red Tb 5" x20 Red Tb 5" x20                                                                                         Modalities  10/2 10/8 10/11     CP to L shoulder post tx Defers Defers  Defers

## 2019-10-15 ENCOUNTER — OFFICE VISIT (OUTPATIENT)
Dept: PHYSICAL THERAPY | Facility: CLINIC | Age: 74
End: 2019-10-15
Payer: MEDICARE

## 2019-10-15 DIAGNOSIS — M25.512 ACUTE PAIN OF LEFT SHOULDER: Primary | ICD-10-CM

## 2019-10-15 PROCEDURE — 97110 THERAPEUTIC EXERCISES: CPT | Performed by: PHYSICAL THERAPIST

## 2019-10-15 PROCEDURE — 97140 MANUAL THERAPY 1/> REGIONS: CPT | Performed by: PHYSICAL THERAPIST

## 2019-10-15 NOTE — PROGRESS NOTES
Daily Note     Today's date: 10/15/2019  Patient name: Sandra Reyes  : 1945  MRN: [id number]  Referring provider: Ismael Pa MD  Dx:   Encounter Diagnosis     ICD-10-CM    1  Acute pain of left shoulder M25 512                   Subjective: "It's been hurting for the last 3 days  I don't know if it will ever get better "      Objective: See treatment diary below      Assessment: Tolerated treatment well  Held from isometrics and decreased length of hold for pec stretch this date  Patient reports feeling better at end of session compared to start of session  Patient demonstrated fatigue post treatment, exhibited good technique with therapeutic exercises and would benefit from continued PT      Plan: Continue per plan of care  Progress treatment as tolerated         Precautions: PMH significant for ischemic heart disease, angioplasty, HTN and high cholesterol  Re-eval due 10/28  Manual  10/2 10/8 10/11 10/15    L shoulder PROM to jory x x x x                             15 min 15 mins 10 mins 10 mins        Exercise Diary  10/2 10/8 10/11 10/15     rpm 5' fwd/ 5' retro 120 rpm 5' fwd/ 5' retro 120 rpm 5' fwd/ 5' retro 120 rpm 5' fwd/ 5' retro    Pulleys - flex/scap 10" x10 ea 10" x10 ea 10" x10 ea 10" x10 ea    Finger ladder - flex/scap 10" x10 ea 10" x10 ea 10"x10 ea 10" x10 ea    Supine cane AAROM flex 10" x10  10" x10 ea 10"x10  10"x10    Supine cane AAROM scap  10" x10 ea 10" x10 10"x10    Pendulums - all directions        Shoulder isometrics -submax -  flex/ext/IR/ER 5" x10 ea 5" x10 ea 5" x10 held    Doorway pec stretch 10"x10 10"x10 30"x4 10"x10    TB MTP Red Tb 5" x20 Red Tb 5" x20 Red Tb 5" x20 Red TB 5" x20    TB LTP Red Tb 5" x20 Red Tb 5" x20 Red Tb 5" x20 Red TB 5" x20                                                                                        Modalities  10/2 10/8 10/11 10/15    CP to L shoulder post tx Defers Defers  Defers  defers

## 2019-10-18 ENCOUNTER — OFFICE VISIT (OUTPATIENT)
Dept: PHYSICAL THERAPY | Facility: CLINIC | Age: 74
End: 2019-10-18
Payer: MEDICARE

## 2019-10-18 DIAGNOSIS — M25.512 ACUTE PAIN OF LEFT SHOULDER: Primary | ICD-10-CM

## 2019-10-18 PROCEDURE — 97110 THERAPEUTIC EXERCISES: CPT | Performed by: PHYSICAL THERAPIST

## 2019-10-18 PROCEDURE — 97140 MANUAL THERAPY 1/> REGIONS: CPT | Performed by: PHYSICAL THERAPIST

## 2019-10-18 NOTE — PROGRESS NOTES
Daily Note     Today's date: 10/18/2019  Patient name: Narcisa Ford  : 1945  MRN: [id number]  Referring provider: Arden Dickson MD  Dx:   Encounter Diagnosis     ICD-10-CM    1  Acute pain of left shoulder M25 512                   Subjective: Patient reports he is doing better today than the last time he was here " I think I just over did it that one time "      Objective: See treatment diary below      Assessment: Tolerated treatment well  Improved PROm and AROM this date  Progressed AAROM from supine to standing  Patient demonstrated fatigue post treatment, exhibited good technique with therapeutic exercises and would benefit from continued PT      Plan: Continue per plan of care  Progress treatment as tolerated         Precautions: PMH significant for ischemic heart disease, angioplasty, HTN and high cholesterol  Re-eval due 10/28  Manual  10/2 10/8 10/11 10/15 10/18   L shoulder PROM to jory x x x x x                            15 min 15 mins 10 mins 10 mins 15 mins       Exercise Diary  10/2 10/8 10/11 10/15 10/18    rpm 5' fwd/ 5' retro 120 rpm 5' fwd/ 5' retro 120 rpm 5' fwd/ 5' retro 120 rpm 5' fwd/ 5' retro 120 rpm 5' fwd/ 5' retro   Pulleys - flex/scap 10" x10 ea 10" x10 ea 10" x10 ea 10" x10 ea 10" x10 ea   Finger ladder - flex/scap 10" x10 ea 10" x10 ea 10"x10 ea 10" x10 ea 10" x10 ea   Supine cane AAROM flex 10" x10  10" x10 ea 10"x10  10"x10 Standing  5" 1x10   Supine cane AAROM scap  10" x10 ea 10" x10 10"x10 Standing  5" 1x10   Pendulums - all directions        Shoulder isometrics -submax -  flex/ext/IR/ER 5" x10 ea 5" x10 ea 5" x10 held    Doorway pec stretch 10"x10 10"x10 30"x4 10"x10 10"x10   TB MTP Red Tb 5" x20 Red Tb 5" x20 Red Tb 5" x20 Red TB 5" x20 Red TB 5" x20   TB LTP Red Tb 5" x20 Red Tb 5" x20 Red Tb 5" x20 Red TB 5" x20 Red TB 5" x20

## 2019-10-24 ENCOUNTER — OFFICE VISIT (OUTPATIENT)
Dept: PHYSICAL THERAPY | Facility: CLINIC | Age: 74
End: 2019-10-24
Payer: MEDICARE

## 2019-10-24 DIAGNOSIS — M25.512 ACUTE PAIN OF LEFT SHOULDER: Primary | ICD-10-CM

## 2019-10-24 PROCEDURE — 97140 MANUAL THERAPY 1/> REGIONS: CPT

## 2019-10-24 PROCEDURE — 97110 THERAPEUTIC EXERCISES: CPT

## 2019-10-24 NOTE — PROGRESS NOTES
Daily Note     Today's date: 10/24/2019  Patient name: Alvin Beckman  : 1945  MRN: [id number]  Referring provider: Tati Bhatt MD  Dx:   Encounter Diagnosis     ICD-10-CM    1  Acute pain of left shoulder M25 512                   Subjective: Pt  reports he has some pain in his L shoulder which seems to be worse at night  Pt  also c/o "numbness" in L upper arm  Objective: See treatment diary below      Assessment: Tolerated treatment well  Patient exhibited good technique with therapeutic exercises and would benefit from continued PT  Noted decreased musculature L UT/scapular area  Plan: Progress treatment as tolerated  RA NV       Precautions: PMH significant for ischemic heart disease, angioplasty, HTN and high cholesterol  Re-eval due 10/28  Manual  10/24 10/8 10/11 10/15 10/18   L shoulder PROM to jory x x x x x                            15 min 15 mins 10 mins 10 mins 15 mins       Exercise Diary  10/24 10/8 10/11 10/15 10/18    rpm 5' fwd/ 5' retro 120 rpm 5' fwd/ 5' retro 120 rpm 5' fwd/ 5' retro 120 rpm 5' fwd/ 5' retro 120 rpm 5' fwd/ 5' retro   Pulleys - flex/scap 10" x10 ea 10" x10 ea 10" x10 ea 10" x10 ea 10" x10 ea   Finger ladder - flex/scap 10" x10 ea 10" x10 ea 10"x10 ea 10" x10 ea 10" x10 ea   Supine cane AAROM flex Standing 5"  1x10  10" x10 ea 10"x10  10"x10 Standing  5" 1x10   Supine cane AAROM scap Standing 5" 1x10 10" x10 ea 10" x10 10"x10 Standing  5" 1x10   Pendulums - all directions        Shoulder isometrics -submax -  flex/ext/IR/ER held 5" x10 ea 5" x10 held    Doorway pec stretch 10"x10 10"x10 30"x4 10"x10 10"x10   TB MTP Red Tb 5" x20 Red Tb 5" x20 Red Tb 5" x20 Red TB 5" x20 Red TB 5" x20   TB LTP Red Tb 5" x20 Red Tb 5" x20 Red Tb 5" x20 Red TB 5" x20 Red TB 5" x20

## 2019-10-28 ENCOUNTER — EVALUATION (OUTPATIENT)
Dept: PHYSICAL THERAPY | Facility: CLINIC | Age: 74
End: 2019-10-28
Payer: MEDICARE

## 2019-10-28 DIAGNOSIS — M25.512 ACUTE PAIN OF LEFT SHOULDER: Primary | ICD-10-CM

## 2019-10-28 PROCEDURE — 97140 MANUAL THERAPY 1/> REGIONS: CPT | Performed by: PHYSICAL THERAPIST

## 2019-10-28 PROCEDURE — 97164 PT RE-EVAL EST PLAN CARE: CPT | Performed by: PHYSICAL THERAPIST

## 2019-10-28 PROCEDURE — 97110 THERAPEUTIC EXERCISES: CPT | Performed by: PHYSICAL THERAPIST

## 2019-10-28 NOTE — LETTER
2019    James Olson MD  [address] [facility] [address]    Patient: Caity Ch   YOB: 1945   Date of Visit: 10/28/2019     Encounter Diagnosis     ICD-10-CM    1  Acute pain of left shoulder M25 512        Dear Dr Deluca Heart:    Thank you for your recent referral of Caity Ch  Please review the attached evaluation summary from Omero's recent visit  Please verify that you agree with the plan of care by signing the attached order  If you have any questions or concerns, please do not hesitate to call  I sincerely appreciate the opportunity to share in the care of one of your patients and hope to have another opportunity to work with you in the near future  Sincerely,    Alexi Stark PT      Referring Provider:      I certify that I have read the below Plan of Care and certify the need for these services furnished under this plan of treatment while under my care  James Olson MD  [address] [facility] [address]  [address] [place]          PT Re-evaluation     Today's date: 10/28/2019  Patient name: Caity Ch  : 1945  MRN: [id number]  Referring provider: Layla Almanza MD  Dx:   Encounter Diagnosis     ICD-10-CM    1  Acute pain of left shoulder M25 512                   Assessment  Caity Ch has been compliant with PT services for a total of 8 visits including IE and re-evaluation  He has been attending 2 days/week  He has demonstrated decreased pain, increased strength, increased range of motion, and increased activity tolerance since starting physical therapy services  He continues to present with L shoulder pain, decreased strength, decreased range of motion, and decreased activity tolerance and would benefit from additional skilled physical therapy interventions to address impairments and maximize function   Patient was provided with a list of orthopedic doctors and recommended he f/u with his referring MD before scheduling with ortho  Impairments: abnormal or restricted ROM, activity intolerance, impaired physical strength, lacks appropriate home exercise program, pain with function and poor posture   Understanding of Dx/Px/POC: good   Prognosis: good    Goals  STGs  1) In 4 weeks, patient will demonstrate improved L shoulder AROM 10 deg or more flex and abd - MET flexion, not met abduction  2) In 4 weeks, patient will report decreased pain level 3 points or more on 10 points scale  - MET at rest, not met with activity  3) In 4 weeks, patient will demonstrate improved L shoulder IR/ER AROM 2 vertebral levels or more - MET  4) In 4 weeks, patient will demonstrate independence with initial HEP  - partially met  5) In 4 weeks, patient will report improved uninterrupted sleep 25% or better  - not met    LTGs  1) In 4-8 weeks patient will report being able to sleep 6 hours or more without being interrupted by pain  2) In 4-8 weeks patient will demonstrate shoulder MMT 4/5 or greater grossly L UE    Plan  Patient would benefit from: skilled physical therapy  Referral necessary: No  Planned modality interventions: cryotherapy, thermotherapy: hydrocollator packs, ultrasound and unattended electrical stimulation  Planned therapy interventions: joint mobilization, manual therapy, massage, Edwards taping, neuromuscular re-education, postural training, patient education, stretching, strengthening, therapeutic exercise, home exercise program, functional ROM exercises and flexibility  Frequency: 2x week  Duration in weeks: 4  POC expiration 11/25/19  Treatment plan discussed with: patient        Subjective Evaluation    History of Present Illness  Date of onset: 7/30/2019  Mechanism of injury: Patient reports he began having L shoulder pain after falling out of bed about 2 months ago   He thinks he was laying on the edge of his bed when he tried to get up, rolled off and hip his L UE off of the ground, "jarring" the L shoulder  Patient reports he was bruised up through his upper arm  He has been using ice and heat  Was prescribed Tramadol but does not notice improvement when taking this medication  Patient had x-ray imaging which was negative for fracture  No injections at this time  Pain is located along L side of neck into shoulder and upper arm but not distal to elbow  Denies N/T in UE  RA 10/28:  Patient reports some days he notices improvements but other days he feels no different  Pain is consistently worse at night  Patient reports taking Aleve p m  To help with getting some sleep He reports this does help him get a little more sleep        Pain  IE       10/28:  Current pain rating: 3    0  At best pain rating: 3    0  At worst pain ratin    7  Quality: throbbing and dull ache  Relieving factors: rest  Aggravating factors: overhead activity and lifting    Social Support    Employment status: not working  Hand dominance: right      Diagnostic Tests  X-ray: normal  Treatments  Current treatment: medication  Patient Goals  Patient goals for therapy: decreased pain          Objective     Cervical/Thoracic Screen   Cervical range of motion within normal limits    Active Range of Motion   IE      RA 10/28:  Left Shoulder   Flexion: 135 degrees with pain  155 deg w/pain  Extension: 50 degrees    50 deg pain free  Abduction: 75 degrees with pain  65 deg w/pain  External rotation BTH: C2 with pain  T3 w/pain  Internal rotation BTB: sacrum with pain L4 w/"stiffness"    Passive Range of Motion   IE      RA 10/28:  Left Shoulder   Flexion: 130 degrees with pain  135 deg w/pain  Abduction: 110 degrees with pain  110 deg w/pain  External rotation 90°: 65 degrees with pain 70 deg pain free  Internal rotation 90°: 30 degrees with pain 30 deg pain free    Strength/Myotome Testing   IE      RA 10/28:  Left Shoulder     Planes of Motion   Flexion: 3-      4- w/pain  Extension: 4+      4+  Abduction: 3-      4- w/pain  External rotation at 0°: 3+    4+ pain free  Internal rotation at 0*: 4   4+ pain free    Isolated Muscles   Biceps: 5   Triceps: 5     Additional Strength Details  Pain reproduced with MMT flexion, abduction, ER    Tests     Left Shoulder   Positive drop arm, empty can, Hawkin's, Ev/Ascencio, passive horizontal adduction and Speed's  Negative anterior load and shift, belly press, external rotation lag sign, Neer's, painful arc, Eder's sign, posterior load and shift and bicep load      RA 10/28: no changes from IE - (+) drop arm, empty can, hendrickson, jobes, passive horizontal adduction and speeds      Flowsheet Rows      Most Recent Value   PT/OT G-Codes   Current Score  64   Projected Score  72        Precautions: PMH significant for ischemic heart disease, angioplasty, HTN and high cholesterol  Re-eval  Manual  10/24 10/28 10/11 10/15 10/18   L shoulder PROM to jory x performed x x x                            15 min 15 mins 10 mins 10 mins 15 mins       Exercise Diary  10/24 10/28 10/11 10/15 10/18    rpm 5' fwd/ 5' retro 120 rpm 5' fwd/ 5' retro 120 rpm 5' fwd/ 5' retro 120 rpm 5' fwd/ 5' retro 120 rpm 5' fwd/ 5' retro   Pulleys - flex/scap 10" x10 ea 10" x10 ea 10" x10 ea 10" x10 ea 10" x10 ea   Finger ladder - flex/scap 10" x10 ea 10" x10 ea 10"x10 ea 10" x10 ea 10" x10 ea   Supine cane AAROM flex Standing 5"  1x10  Resume standing NV 10"x10  10"x10 Standing  5" 1x10   Supine cane AAROM scap Standing 5" 1x10 Resume standing NV 10" x10 10"x10 Standing  5" 1x10   Pendulums - all directions        Shoulder isometrics -submax -  flex/ext/IR/ER held Resume NV 5" x10 held    Doorway pec stretch 10"x10 Resume NV 30"x4 10"x10 10"x10   TB MTP Red Tb 5" x20 Resume NV Red Tb 5" x20 Red TB 5" x20 Red TB 5" x20   TB LTP Red Tb 5" x20 Resume NV Red Tb 5" x20 Red TB 5" x20 Red TB 5" x20   Prone shoulder extension        Prone horizontal abduction        Prone rows        Sidelying ER

## 2019-10-28 NOTE — PROGRESS NOTES
PT Re-evaluation     Today's date: 10/28/2019  Patient name: Caity Ch  : 1945  MRN: [id number]  Referring provider: Layla Almanza MD  Dx:   Encounter Diagnosis     ICD-10-CM    1  Acute pain of left shoulder M25 512                   Assessment  Caity Ch has been compliant with PT services for a total of 8 visits including IE and re-evaluation  He has been attending 2 days/week  He has demonstrated decreased pain, increased strength, increased range of motion, and increased activity tolerance since starting physical therapy services  He continues to present with L shoulder pain, decreased strength, decreased range of motion, and decreased activity tolerance and would benefit from additional skilled physical therapy interventions to address impairments and maximize function  Patient was provided with a list of orthopedic doctors and recommended he f/u with his referring MD before scheduling with ortho  Impairments: abnormal or restricted ROM, activity intolerance, impaired physical strength, lacks appropriate home exercise program, pain with function and poor posture   Understanding of Dx/Px/POC: good   Prognosis: good    Goals  STGs  1) In 4 weeks, patient will demonstrate improved L shoulder AROM 10 deg or more flex and abd - MET flexion, not met abduction  2) In 4 weeks, patient will report decreased pain level 3 points or more on 10 points scale  - MET at rest, not met with activity  3) In 4 weeks, patient will demonstrate improved L shoulder IR/ER AROM 2 vertebral levels or more - MET  4) In 4 weeks, patient will demonstrate independence with initial HEP  - partially met  5) In 4 weeks, patient will report improved uninterrupted sleep 25% or better   - not met    LTGs  1) In 4-8 weeks patient will report being able to sleep 6 hours or more without being interrupted by pain  2) In 4-8 weeks patient will demonstrate shoulder MMT 4/5 or greater grossly L UE    Plan  Patient would benefit from: skilled physical therapy  Referral necessary: No  Planned modality interventions: cryotherapy, thermotherapy: hydrocollator packs, ultrasound and unattended electrical stimulation  Planned therapy interventions: joint mobilization, manual therapy, massage, Edwards taping, neuromuscular re-education, postural training, patient education, stretching, strengthening, therapeutic exercise, home exercise program, functional ROM exercises and flexibility  Frequency: 2x week  Duration in weeks: 4  POC expiration 19  Treatment plan discussed with: patient        Subjective Evaluation    History of Present Illness  Date of onset: 2019  Mechanism of injury: Patient reports he began having L shoulder pain after falling out of bed about 2 months ago  He thinks he was laying on the edge of his bed when he tried to get up, rolled off and hip his L UE off of the ground, "jarring" the L shoulder  Patient reports he was bruised up through his upper arm  He has been using ice and heat  Was prescribed Tramadol but does not notice improvement when taking this medication  Patient had x-ray imaging which was negative for fracture  No injections at this time  Pain is located along L side of neck into shoulder and upper arm but not distal to elbow  Denies N/T in UE  RA 10/28:  Patient reports some days he notices improvements but other days he feels no different  Pain is consistently worse at night  Patient reports taking Aleve p m  To help with getting some sleep He reports this does help him get a little more sleep        Pain  IE      RA 10/28:  Current pain rating: 3    0  At best pain rating: 3    0  At worst pain ratin    7  Quality: throbbing and dull ache  Relieving factors: rest  Aggravating factors: overhead activity and lifting    Social Support    Employment status: not working  Hand dominance: right      Diagnostic Tests  X-ray: normal  Treatments  Current treatment: medication  Patient Goals  Patient goals for therapy: decreased pain          Objective     Cervical/Thoracic Screen   Cervical range of motion within normal limits    Active Range of Motion   IE      RA 10/28:  Left Shoulder   Flexion: 135 degrees with pain  155 deg w/pain  Extension: 50 degrees    50 deg pain free  Abduction: 75 degrees with pain  65 deg w/pain  External rotation BTH: C2 with pain  T3 w/pain  Internal rotation BTB: sacrum with pain L4 w/"stiffness"    Passive Range of Motion   IE      RA 10/28:  Left Shoulder   Flexion: 130 degrees with pain  135 deg w/pain  Abduction: 110 degrees with pain  110 deg w/pain  External rotation 90°: 65 degrees with pain 70 deg pain free  Internal rotation 90°: 30 degrees with pain 30 deg pain free    Strength/Myotome Testing   IE      RA 10/28:  Left Shoulder     Planes of Motion   Flexion: 3-      4- w/pain  Extension: 4+      4+  Abduction: 3-      4- w/pain  External rotation at 0°: 3+    4+ pain free  Internal rotation at 0*: 4   4+ pain free    Isolated Muscles   Biceps: 5   Triceps: 5     Additional Strength Details  Pain reproduced with MMT flexion, abduction, ER    Tests     Left Shoulder   Positive drop arm, empty can, Hawkin's, Ev/Ascencio, passive horizontal adduction and Speed's  Negative anterior load and shift, belly press, external rotation lag sign, Neer's, painful arc, Eder's sign, posterior load and shift and bicep load      RA 10/28: no changes from IE - (+) drop arm, empty can, hendrickson, jobes, passive horizontal adduction and speeds      Flowsheet Rows      Most Recent Value   PT/OT G-Codes   Current Score  64   Projected Score  72        Precautions: PMH significant for ischemic heart disease, angioplasty, HTN and high cholesterol  Re-eval  Manual  10/24 10/28 10/11 10/15 10/18   L shoulder PROM to jory x performed x x x                            15 min 15 mins 10 mins 10 mins 15 mins       Exercise Diary  10/24 10/28 10/11 10/15 10/18    rpm 5' fwd/ 5' retro 120 rpm 5' fwd/ 5' retro 120 rpm 5' fwd/ 5' retro 120 rpm 5' fwd/ 5' retro 120 rpm 5' fwd/ 5' retro   Pulleys - flex/scap 10" x10 ea 10" x10 ea 10" x10 ea 10" x10 ea 10" x10 ea   Finger ladder - flex/scap 10" x10 ea 10" x10 ea 10"x10 ea 10" x10 ea 10" x10 ea   Supine cane AAROM flex Standing 5"  1x10  Resume standing NV 10"x10  10"x10 Standing  5" 1x10   Supine cane AAROM scap Standing 5" 1x10 Resume standing NV 10" x10 10"x10 Standing  5" 1x10   Pendulums - all directions        Shoulder isometrics -submax -  flex/ext/IR/ER held Resume NV 5" x10 held    Doorway pec stretch 10"x10 Resume NV 30"x4 10"x10 10"x10   TB MTP Red Tb 5" x20 Resume NV Red Tb 5" x20 Red TB 5" x20 Red TB 5" x20   TB LTP Red Tb 5" x20 Resume NV Red Tb 5" x20 Red TB 5" x20 Red TB 5" x20   Prone shoulder extension        Prone horizontal abduction        Prone rows        Sidelying ER

## 2019-10-31 ENCOUNTER — OFFICE VISIT (OUTPATIENT)
Dept: PHYSICAL THERAPY | Facility: CLINIC | Age: 74
End: 2019-10-31
Payer: MEDICARE

## 2019-10-31 DIAGNOSIS — M25.512 ACUTE PAIN OF LEFT SHOULDER: Primary | ICD-10-CM

## 2019-10-31 PROCEDURE — 97110 THERAPEUTIC EXERCISES: CPT

## 2019-10-31 PROCEDURE — 97140 MANUAL THERAPY 1/> REGIONS: CPT

## 2019-10-31 NOTE — PROGRESS NOTES
Daily Note     Today's date: 10/31/2019  Patient name: Fauzia Goodwin  : 1945  MRN: [id number]  Referring provider: Nataliya Busch MD  Dx:   Encounter Diagnosis     ICD-10-CM    1  Acute pain of left shoulder M25 512                   Subjective: "I am not sure if this therapy is helping or not  I still have pain on and off "      Objective: See treatment diary below      Assessment: Tolerated treatment well  Progressed to heavier TB resistance during multiple exercises to strengthen the L shoulder  Pt remains limited in all directions during PROM of the R shoulder  Patient demonstrated fatigue post treatment and would benefit from continued PT      Plan: Continue per plan of care        Precautions: PMH significant for ischemic heart disease, angioplasty, HTN and high cholesterol  Re-eval  Manual  10/24 10/28 10/31 10/15 10/18   L shoulder PROM to jroy x performed performed x x                            15 min 15 mins 15 mins 10 mins 15 mins       Exercise Diary  10/24 10/28 10/31 10/15 10/18    rpm 5' fwd/ 5' retro 120 rpm 5' fwd/ 5' retro 120 rpm 5' fwd/ 5' retro 120 rpm 5' fwd/ 5' retro 120 rpm 5' fwd/ 5' retro   Pulleys - flex/scap 10" x10 ea 10" x10 ea 10" x10 ea 10" x10 ea 10" x10 ea   Finger ladder - flex/scap 10" x10 ea 10" x10 ea 10"x10 ea 10" x10 ea 10" x10 ea   Supine cane AAROM flex Standing 5"  1x10  Resume standing NV 10"x10  10"x10 Standing  5" 1x10   Supine cane AAROM scap Standing 5" 1x10 Resume standing NV Standing 5" 1x10 10"x10 Standing  5" 1x10   Pendulums - all directions        Shoulder isometrics -submax -  flex/ext/IR/ER held Resume NV  held    Doorway pec stretch 10"x10 Resume NV 10"x10 10"x10 10"x10   TB MTP Red Tb 5" x20 Resume NV Green Tb 5" x20 Red TB 5" x20 Red TB 5" x20   TB LTP Red Tb 5" x20 Resume NV Green Tb 5" x20 Red TB 5" x20 Red TB 5" x20   Prone shoulder extension        Prone horizontal abduction        Prone rows        Sidelying ER

## 2019-11-05 ENCOUNTER — OFFICE VISIT (OUTPATIENT)
Dept: PHYSICAL THERAPY | Facility: CLINIC | Age: 74
End: 2019-11-05
Payer: MEDICARE

## 2019-11-05 DIAGNOSIS — M25.512 ACUTE PAIN OF LEFT SHOULDER: Primary | ICD-10-CM

## 2019-11-05 PROCEDURE — 97110 THERAPEUTIC EXERCISES: CPT

## 2019-11-05 PROCEDURE — 97140 MANUAL THERAPY 1/> REGIONS: CPT

## 2019-11-05 NOTE — PROGRESS NOTES
Daily Note     Today's date: 2019  Patient name: Yolanda Roman  : 1945  MRN: [id number]  Referring provider: Candy Brice MD  Dx:   Encounter Diagnosis     ICD-10-CM    1  Acute pain of left shoulder M25 512                   Subjective: Pt reports he finally notices a difference L shoulder, still painful,but less severe  Reports he was able to sleep  States he returns to Dr Nohemi Rollins 19  Objective: See treatment diary below      Assessment: Tolerated treatment well  Verbal cues for proper exercise performance  Patient exhibited good technique with therapeutic exercises and would benefit from continued PT      Plan: Continue per plan of care        Precautions: PMH significant for ischemic heart disease, angioplasty, HTN and high cholesterol  Re-eval   Manual  10/24 10/28 10/31 11/5 10/18   L shoulder PROM to jory x performed performed performed x                            15 min 15 mins 15 mins 10 mins 15 mins       Exercise Diary  10/24 10/28 10/31 11/5 10/18    rpm 5' fwd/ 5' retro 120 rpm 5' fwd/ 5' retro 120 rpm 5' fwd/ 5' retro 100 rpm  5'fwd/5'retro 120 rpm 5' fwd/ 5' retro   Pulleys - flex/scap 10" x10 ea 10" x10 ea 10" x10 ea 10 x10" ea 10" x10 ea   Finger ladder - flex/scap 10" x10 ea 10" x10 ea 10"x10 ea 10 x10 10" x10 ea   Supine cane AAROM flex Standing 5"  1x10  Resume standing NV 10"x10  Standing  5" x10 Standing  5" 1x10   Supine cane AAROM scap Standing 5" 1x10 Resume standing NV Standing 5" 1x10 Standing   5" x10 Standing  5" 1x10   Pendulums - all directions        Shoulder isometrics -submax -  flex/ext/IR/ER held Resume NV      Doorway pec stretch 10"x10 Resume NV 10"x10 10x10" 10"x10   TB MTP Red Tb 5" x20 Resume NV Green Tb 5" x20 Green TB   5" x20 Red TB 5" x20   TB LTP Red Tb 5" x20 Resume NV Green Tb 5" x20 Green TB  x20 Red TB 5" x20   Prone shoulder extension        Prone horizontal abduction        Prone rows        Sidelying ER

## 2019-11-07 ENCOUNTER — OFFICE VISIT (OUTPATIENT)
Dept: PHYSICAL THERAPY | Facility: CLINIC | Age: 74
End: 2019-11-07
Payer: MEDICARE

## 2019-11-07 DIAGNOSIS — M25.512 ACUTE PAIN OF LEFT SHOULDER: Primary | ICD-10-CM

## 2019-11-07 PROCEDURE — 97110 THERAPEUTIC EXERCISES: CPT | Performed by: PHYSICAL THERAPIST

## 2019-11-07 PROCEDURE — 97140 MANUAL THERAPY 1/> REGIONS: CPT | Performed by: PHYSICAL THERAPIST

## 2019-11-07 NOTE — PROGRESS NOTES
Daily Note     Today's date: 2019  Patient name: Jannie Perez  : 1945  MRN: [id number]  Referring provider: Radha Ambrocio MD  Dx:   Encounter Diagnosis     ICD-10-CM    1  Acute pain of left shoulder M25 512                   Subjective: "It's feeling okay  I felt really good last Thursday for about 3 days  It's still painful but I can lift it better "      Objective: See treatment diary below      Assessment: Tolerated treatment well  Making progress with active arm raise above shoulder height  Patient demonstrated fatigue post treatment, exhibited good technique with therapeutic exercises and would benefit from continued PT      Plan: Continue per plan of care  Progress treatment as tolerated         Precautions: PMH significant for ischemic heart disease, angioplasty, HTN and high cholesterol  Re-eval   Manual  10/24 10/28 10/31 11/5 11/7   L shoulder PROM to jory x performed performed performed performed                           Total time 15 min 15 mins 15 mins 10 mins 10 mins       Exercise Diary  10/24 10/28 10/31 11/5 11/7    rpm 5' fwd/ 5' retro 120 rpm 5' fwd/ 5' retro 120 rpm 5' fwd/ 5' retro 100 rpm  5'fwd/5'retro 100 rpm 5' fwd/ 5' retro   Pulleys - flex/scap 10" x10 ea 10" x10 ea 10" x10 ea 10 x10" ea 10" x10 ea   Finger ladder - flex/scap 10" x10 ea 10" x10 ea 10"x10 ea 10 x10 10" x10 ea   Supine cane AAROM flex Standing 5"  1x10  Resume standing NV 10"x10  Standing  5" x10 Standing  5" 1x10   Supine cane AAROM scap Standing 5" 1x10 Resume standing NV Standing 5" 1x10 Standing   5" x10 Standing  5" 1x10   Pendulums - all directions        Shoulder isometrics -submax -  flex/ext/IR/ER held Resume NV      Doorway pec stretch 10"x10 Resume NV 10"x10 10x10" 10"x10   TB MTP Red Tb 5" x20 Resume NV Green Tb 5" x20 Green TB   5" x20 Green TB 5" x20   TB LTP Red Tb 5" x20 Resume NV Green Tb 5" x20 Green TB  x20 Green TB 5" x20   Prone shoulder extension        Prone horizontal abduction        Prone rows        Sidelying ER

## 2019-11-12 ENCOUNTER — OFFICE VISIT (OUTPATIENT)
Dept: PHYSICAL THERAPY | Facility: CLINIC | Age: 74
End: 2019-11-12
Payer: MEDICARE

## 2019-11-12 DIAGNOSIS — M25.512 ACUTE PAIN OF LEFT SHOULDER: Primary | ICD-10-CM

## 2019-11-12 PROCEDURE — 97140 MANUAL THERAPY 1/> REGIONS: CPT

## 2019-11-12 PROCEDURE — 97110 THERAPEUTIC EXERCISES: CPT

## 2019-11-12 NOTE — PROGRESS NOTES
Daily Note     Today's date: 2019  Patient name: Alvin Beckman  : 1945  MRN: [id number]  Referring provider: Tati Bhatt MD  Dx:   Encounter Diagnosis     ICD-10-CM    1  Acute pain of left shoulder M25 512                   Subjective: Pt  reports his shoulder is getting better  Objective: See treatment diary below      Assessment: Tolerated treatment well  Patient exhibited good technique with therapeutic exercises and would benefit from continued PT  Tightness noted posterior capsule with IR limited  Plan: Progress treatment as tolerated         Precautions: PMH significant for ischemic heart disease, angioplasty, HTN and high cholesterol  Re-eval   Manual  11/12 10/28 10/31 11/5 11/7   L shoulder PROM to jory performed performed performed performed performed                           Total time 15 min 15 mins 15 mins 10 mins 10 mins       Exercise Diary  11/12 10/28 10/31 11/5 11/7    rpm 5' fwd/ 5' retro 120 rpm 5' fwd/ 5' retro 120 rpm 5' fwd/ 5' retro 100 rpm  5'fwd/5'retro 100 rpm 5' fwd/ 5' retro   Pulleys - flex/scap 10" x10 ea 10" x10 ea 10" x10 ea 10 x10" ea 10" x10 ea   Finger ladder - flex/scap 10" x10 ea 10" x10 ea 10"x10 ea 10 x10 10" x10 ea   Supine cane AAROM flex Standing 5"  1x10  Resume standing NV 10"x10  Standing  5" x10 Standing  5" 1x10   Supine cane AAROM scap Standing 5" 1x10 Resume standing NV Standing 5" 1x10 Standing   5" x10 Standing  5" 1x10   Pendulums - all directions        Shoulder isometrics -submax -  flex/ext/IR/ER  Resume NV      Doorway pec stretch 10"x10 Resume NV 10"x10 10x10" 10"x10   TB MTP Green TB 5" x20 Resume NV Green Tb 5" x20 Green TB   5" x20 Green TB 5" x20   TB LTP Green TB 5" x20 Resume NV Green Tb 5" x20 Green TB  x20 Green TB 5" x20   Prone shoulder extension        Prone horizontal abduction        Prone rows        Sidelying ER

## 2019-11-13 ENCOUNTER — OFFICE VISIT (OUTPATIENT)
Dept: NEPHROLOGY | Facility: CLINIC | Age: 74
End: 2019-11-13
Payer: MEDICARE

## 2019-11-13 VITALS
HEART RATE: 72 BPM | WEIGHT: 180 LBS | DIASTOLIC BLOOD PRESSURE: 84 MMHG | SYSTOLIC BLOOD PRESSURE: 136 MMHG | BODY MASS INDEX: 28.93 KG/M2 | HEIGHT: 66 IN

## 2019-11-13 DIAGNOSIS — Z12.5 ENCOUNTER FOR PROSTATE CANCER SCREENING: ICD-10-CM

## 2019-11-13 DIAGNOSIS — Z23 ENCOUNTER FOR ADMINISTRATION OF VACCINE: ICD-10-CM

## 2019-11-13 DIAGNOSIS — M25.512 ACUTE PAIN OF LEFT SHOULDER: ICD-10-CM

## 2019-11-13 DIAGNOSIS — I10 ESSENTIAL HYPERTENSION: Primary | ICD-10-CM

## 2019-11-13 DIAGNOSIS — I25.5 ISCHEMIC CARDIOMYOPATHY: ICD-10-CM

## 2019-11-13 DIAGNOSIS — E78.00 PURE HYPERCHOLESTEROLEMIA: ICD-10-CM

## 2019-11-13 PROCEDURE — G0008 ADMIN INFLUENZA VIRUS VAC: HCPCS

## 2019-11-13 PROCEDURE — 99214 OFFICE O/P EST MOD 30 MIN: CPT | Performed by: INTERNAL MEDICINE

## 2019-11-13 PROCEDURE — 90662 IIV NO PRSV INCREASED AG IM: CPT

## 2019-11-13 NOTE — PROGRESS NOTES
[address] [facility] MD    Name: Marzena Mckeon  YOB: 1945      Assessment/Plan:    Shoulder pain, left  See an orthopedist for devaluation of left upper arm pain         Problem List Items Addressed This Visit        Cardiovascular and Mediastinum    Ischemic cardiomyopathy       Other    Shoulder pain, left     See an orthopedist for devaluation of left upper arm pain         Encounter for administration of vaccine      Other Visit Diagnoses     Essential hypertension    -  Primary            Subjective:      Patient ID: Marzena Mckeon is a 69 y o  male  HPI  Has a history of hypertension and ischemic cardiomyopathy  He had fallen OOB onto his left arm  An xray was unrevealing  He was unable to lift his arm but this has improved  He still has pain at night  He needs to see ortho and has cards for [facility]  No problems with medications  He is following a low salt diet    The following portions of the patient's history were reviewed and updated as appropriate: allergies, current medications, past family history, past medical history, past social history, past surgical history and problem list     Review of Systems   Constitutional: Negative for activity change, appetite change, fatigue and unexpected weight change  HENT: Negative for congestion, ear discharge, ear pain, trouble swallowing and voice change  Eyes: Negative for pain, discharge and visual disturbance  Respiratory: Negative for cough, chest tightness, shortness of breath and wheezing  Cardiovascular: Negative for chest pain, palpitations and leg swelling  Dyspnea on incline   Gastrointestinal: Negative for abdominal pain, blood in stool, constipation, diarrhea, nausea and vomiting  Endocrine: Negative for heat intolerance, polydipsia, polyphagia and polyuria  Genitourinary: Negative for decreased urine volume, difficulty urinating, frequency and urgency  Musculoskeletal: Positive for arthralgias  Negative for back pain and gait problem  Skin: Negative for color change and rash  Neurological: Negative for dizziness, weakness and headaches           Social History     Socioeconomic History    Marital status: /Civil Union     Spouse name: None    Number of children: None    Years of education: None    Highest education level: None   Occupational History    Occupation:     Social Needs    Financial resource strain: None    Food insecurity:     Worry: None     Inability: None    Transportation needs:     Medical: None     Non-medical: None   Tobacco Use    Smoking status: Former Smoker     Last attempt to quit: 2012     Years since quittin 7    Smokeless tobacco: Never Used   Substance and Sexual Activity    Alcohol use: Never     Frequency: Never    Drug use: Never    Sexual activity: None   Lifestyle    Physical activity:     Days per week: None     Minutes per session: None    Stress: None   Relationships    Social connections:     Talks on phone: None     Gets together: None     Attends [employer] service: None     Active member of club or organization: None     Attends meetings of clubs or organizations: None     Relationship status: None    Intimate partner violence:     Fear of current or ex partner: None     Emotionally abused: None     Physically abused: None     Forced sexual activity: None   Other Topics Concern    None   Social History Narrative    Consumes on average 2 cups of regular coffee per day    Consumes on average 2 cups of hot tea per day      Past Medical History:   Diagnosis Date    Abnormal weight gain     Benign essential hypertension     Carotid artery occlusion     Chronic ischemic heart disease     COPD (chronic obstructive pulmonary disease) ([address] )     Coronary artery disease     Essential hypertension     Hyperlipemia     Hypertension     Obesity with body mass index 30 or greater     Osteoarthritis     Sciatica     Vitamin D deficiency      Past Surgical History:   Procedure Laterality Date    ANGIOPLASTY      APPENDECTOMY         Current Outpatient Medications:     ALPRAZolam (XANAX) 0 25 mg tablet, Take by mouth, Disp: , Rfl:     clopidogrel (PLAVIX) 75 mg tablet, Take by mouth, Disp: , Rfl:     diclofenac sodium (VOLTAREN) 1 %, Place on the skin, Disp: , Rfl:     ezetimibe (ZETIA) 10 mg tablet, Take by mouth, Disp: , Rfl:     isosorbide mononitrate (IMDUR) 30 mg 24 hr tablet, Take by mouth, Disp: , Rfl:     lisinopril (ZESTRIL) 10 mg tablet, , Disp: , Rfl:     traMADol (ULTRAM) 50 mg tablet, Take 1 tablet (50 mg total) by mouth daily at bedtime, Disp: 30 tablet, Rfl: 0     Lab Results   Component Value Date    SODIUM 135 (L) 06/12/2019    K 4 2 06/12/2019     06/12/2019    CO2 26 06/12/2019    AGAP 4 06/12/2019    BUN 20 06/12/2019    CREATININE 1 11 06/12/2019    GLUC 95 06/12/2019    CALCIUM 8 8 06/12/2019    AST 16 06/12/2019    ALT 22 06/12/2019    ALKPHOS 86 06/12/2019    TP 8 1 06/12/2019    TBILI 0 68 06/12/2019    EGFR 66 06/12/2019     Lab Results   Component Value Date    WBC 7 56 06/12/2019    HGB 15 9 06/12/2019    HCT 47 8 06/12/2019    MCV 97 06/12/2019     06/12/2019     Lab Results   Component Value Date    CHOLESTEROL 135 06/12/2019     Lab Results   Component Value Date    HDL 31 (L) 06/12/2019     Lab Results   Component Value Date    LDLCALC 71 06/12/2019     Lab Results   Component Value Date    TRIG 163 (H) 06/12/2019     No results found for: CHOLHDL  No results found for: [id number], TSH        Objective:      /84 (BP Location: Left arm, Patient Position: Sitting, Cuff Size: Standard)   Pulse 72   Ht 5' 6" (1 676 m)   Wt 81 6 kg (180 lb)   BMI 29 05 kg/m²          Physical Exam   Constitutional: He is oriented to person, place, and time  He appears well-developed and well-nourished  No distress  HENT:   Head: Normocephalic     Right Ear: External ear normal    Left Ear: External ear normal    Nose: Nose normal    Mouth/Throat: Oropharynx is clear and moist    Eyes: Pupils are equal, round, and reactive to light  Conjunctivae are normal    Cardiovascular: Normal rate, regular rhythm and normal heart sounds  No murmur heard  Pulmonary/Chest: Effort normal and breath sounds normal  No respiratory distress  He has no wheezes  He has no rales  Abdominal: Soft  Bowel sounds are normal  He exhibits no distension  There is no tenderness  There is no rebound and no guarding  Musculoskeletal: Normal range of motion  He exhibits no edema  Neurological: He is alert and oriented to person, place, and time  Skin: Skin is warm and dry  Capillary refill takes less than 2 seconds  He is not diaphoretic  Psychiatric: He has a normal mood and affect

## 2019-11-14 ENCOUNTER — OFFICE VISIT (OUTPATIENT)
Dept: PHYSICAL THERAPY | Facility: CLINIC | Age: 74
End: 2019-11-14
Payer: MEDICARE

## 2019-11-14 DIAGNOSIS — M25.512 ACUTE PAIN OF LEFT SHOULDER: Primary | ICD-10-CM

## 2019-11-14 PROCEDURE — 97110 THERAPEUTIC EXERCISES: CPT

## 2019-11-14 PROCEDURE — 97140 MANUAL THERAPY 1/> REGIONS: CPT

## 2019-11-18 ENCOUNTER — OFFICE VISIT (OUTPATIENT)
Dept: OBGYN CLINIC | Facility: CLINIC | Age: 74
End: 2019-11-18
Payer: MEDICARE

## 2019-11-18 VITALS
SYSTOLIC BLOOD PRESSURE: 140 MMHG | RESPIRATION RATE: 16 BRPM | HEIGHT: 66 IN | BODY MASS INDEX: 29.22 KG/M2 | WEIGHT: 181.8 LBS | DIASTOLIC BLOOD PRESSURE: 90 MMHG | HEART RATE: 84 BPM

## 2019-11-18 DIAGNOSIS — M19.012 PRIMARY OSTEOARTHRITIS OF LEFT SHOULDER: ICD-10-CM

## 2019-11-18 DIAGNOSIS — S46.912A SHOULDER STRAIN, LEFT, INITIAL ENCOUNTER: Primary | ICD-10-CM

## 2019-11-18 DIAGNOSIS — M25.512 ACUTE PAIN OF LEFT SHOULDER: ICD-10-CM

## 2019-11-18 PROCEDURE — 20610 DRAIN/INJ JOINT/BURSA W/O US: CPT | Performed by: EMERGENCY MEDICINE

## 2019-11-18 PROCEDURE — 99203 OFFICE O/P NEW LOW 30 MIN: CPT | Performed by: EMERGENCY MEDICINE

## 2019-11-18 RX ORDER — TRIAMCINOLONE ACETONIDE 40 MG/ML
40 INJECTION, SUSPENSION INTRA-ARTICULAR; INTRAMUSCULAR
Status: COMPLETED | OUTPATIENT
Start: 2019-11-18 | End: 2019-11-18

## 2019-11-18 RX ORDER — LIDOCAINE HYDROCHLORIDE 10 MG/ML
4 INJECTION, SOLUTION INFILTRATION; PERINEURAL
Status: COMPLETED | OUTPATIENT
Start: 2019-11-18 | End: 2019-11-18

## 2019-11-18 RX ADMIN — LIDOCAINE HYDROCHLORIDE 4 ML: 10 INJECTION, SOLUTION INFILTRATION; PERINEURAL at 14:05

## 2019-11-18 RX ADMIN — TRIAMCINOLONE ACETONIDE 40 MG: 40 INJECTION, SUSPENSION INTRA-ARTICULAR; INTRAMUSCULAR at 14:05

## 2019-11-18 NOTE — PATIENT INSTRUCTIONS
Steroid Joint Injection   AMBULATORY CARE:   What you need to know about steroid joint injection:  A steroid joint injection is a procedure to inject steroid medicine into a joint  Steroid medicine decreases pain and inflammation  The injection may also contain an anesthetic (numbing medicine) to decrease pain  It may be done to treat conditions such as arthritis, gout, or carpal tunnel syndrome  The injections may be given in your knee, ankle, shoulder, elbow, wrist, or ankle  How to prepare for steroid joint injection:  Your healthcare provider will talk to you about how to prepare for this procedure  He will tell you what medicines to take or not take on the day of your procedure  You may need to stop taking blood thinners several days before your procedure  What will happen during steroid joint injection:  You may be given local anesthesia to numb the area where the injection will be given  With local anesthesia, you may still feel pressure during the procedure, but you should not feel any pain  Your healthcare provider may use ultrasound or fluoroscopy (a type of x-ray) to guide the needle to the right area  He will then inject the steroid into your joint  A bandage will be placed on the injection site  What will happen after steroid joint injection:  You may have redness, warmth, or sweating in your face and chest right after the steroid injection  Steroids can affect blood sugar levels  If you have diabetes, you should check your blood sugars closely in the first 24 hours after your procedure  Risks of steroid joint injection:  You may get an infection in your joint  The injection may also cause more pain during the first 24 to 36 hours  You may need more than one injection to feel pain relief  The skin near the injection site may be damaged and become discolored or indented  This can happen if the steroid is placed too close to your skin   A tendon near the injection site may rupture or a nerve can be damaged  Contact your healthcare provider if:   · You have fever or chills  · You have redness or swelling in the injection site  · You have more pain than usual in your joint for more than 72 hours  · You have questions or concerns about your condition or care  Medicines:   · Pain medicine  may be given  Ask how to take this medicine safely  · Take your medicine as directed  Contact your healthcare provider if you think your medicine is not helping or if you have side effects  Tell him or her if you are allergic to any medicine  Keep a list of the medicines, vitamins, and herbs you take  Include the amounts, and when and why you take them  Bring the list or the pill bottles to follow-up visits  Carry your medicine list with you in case of an emergency  Self-care:   · Leave the bandage on for 8 to 12 hours  Care for your wound as directed  · Rest the area  as directed  You may need to decrease weight on certain joints, such as the knee, for a period of time  Ask when you can return to your daily activities  · Elevate  your limb where the steroid injection was given  Elevate the limb above the level of your heart as often as you can  This will help decrease swelling and pain  Prop your limb on pillows or blankets to keep it elevated comfortably  · Apply ice  on your joint for 15 to 20 minutes every hour or as directed  Use an ice pack, or put crushed ice in a plastic bag  Cover it with a towel  Ice helps prevent tissue damage and decreases swelling and pain  Follow up with your healthcare provider as directed:  Write down your questions so you remember to ask them during your visits  © 2017 [employer] Information is for End User's use only and may not be sold, redistributed or otherwise used for commercial purposes  All illustrations and images included in CareNotes® are the copyrighted property of [employer]  or El[employer] Ceballos    The above information is an  only  It is not intended as medical advice for individual conditions or treatments  Talk to your doctor, nurse or pharmacist before following any medical regimen to see if it is safe and effective for you  Rotator Cuff Injury   WHAT YOU NEED TO KNOW:   What is a rotator cuff injury? A rotator cuff injury is damage to the muscles or tendons of your rotator cuff  A tendon is a cord of tough tissue that connects your muscles to your bones  The rotator cuff is made up of a group of muscles and tendons that hold the shoulder joint in place  The damage may include stretching of your muscle or tears in the tendons  It may also include inflammation of the bursa (small sack of fluid around the joint)  What causes a rotator cuff injury? · Wear and tear of the tendons: Your tendons get weaker as you get older  · Impingement: This happens when the bone of your upper arm presses on the tendon of your shoulder when you lift your arm  Impingement causes pain and inflammation that weaken your rotator cuff  · Overuse or injury:  Heavy lifting, throwing, or overuse may damage the rotator cuff  Sports such as baseball and tennis may injure your rotator cuff when your arm goes over your head  A fall or other shoulder injury may also damage your rotator cuff  What are the signs and symptoms of a rotator cuff injury? · Pain or stiffness: You may feel pain in your shoulder that travels down your arm  You may feel pain all of the time or only sometimes  You may feel pain when you lie on the side of your injured shoulder  · Decreased range of motion:  You may have trouble lifting your arm or placing it behind your back  It may also be hard to use your shoulder  If you have a severe injury, you may not be able to move your shoulder at all  · Tenderness or swelling: Your shoulder area may swell and be painful to the touch  · Numbness: You may lose feeling in part or all of your arm   This is more common in athletes who throw as part of their sport, such as baseball pitchers  · A popping noise: You may hear this noise and feel pain when you lift your arm  How is a rotator cuff injury diagnosed? Your healthcare provider will ask if you have had an injury or surgery on your shoulder  He will examine your shoulder, and test the strength and movement of your arm  You may need other tests to show what is causing your symptoms  · Ultrasound: An ultrasound uses sound waves to show pictures on a monitor  An ultrasound may be done to show fluid or swelling around your rotator cuff  · X-ray:  An x-ray may show injury to the bones and tissues in your shoulder  · CT scan: This test is also called a CAT scan  An x-ray machine uses a computer to take pictures of your shoulder  You may be given a dye before the pictures are taken to help healthcare providers see the pictures better  Tell the healthcare provider if you have ever had an allergic reaction to contrast dye  · MRI:  This scan uses powerful magnets and a computer to take pictures of your shoulder  An MRI may show damage to your muscles or tendons  You may be given dye to help the pictures show up better  Tell the healthcare provider if you have ever had an allergic reaction to contrast dye  Do not enter the MRI room with any metal  Metal can cause serious injury  Tell the healthcare provider if you have any metal in or on your body  How is a rotator cuff injury treated? · Medicines:      ¨ Acetaminophen: This medicine decreases pain  Acetaminophen is available without a doctor's order  Ask how much to take and how often to take it  Follow directions  Acetaminophen can cause liver damage if not taken correctly  ¨ NSAIDs:  These medicines decrease swelling, pain, and fever  NSAIDs are available without a doctor's order  Ask your healthcare provider which medicine is right for you  Ask how much to take and when to take it  Take as directed  NSAIDs can cause stomach bleeding or kidney problems if not taken correctly  ¨ Pain medicine: You may be given a prescription medicine to decrease pain  Do not wait until the pain is severe before you take this medicine  ¨ Steroids: This medicine may be injected into the rotator cuff area to decrease inflammation and pain  · Physical therapy:  A physical therapist can teach you exercises to help improve movement and strength, and to decrease pain  These exercises may help you go back to your usual activities or return to playing sports  · Surgery: You may need surgery if your injury is severe or your symptoms do not improve  You may also need surgery to decrease your signs and symptoms if other treatments have not worked  ¨ Debridement and repair: This surgery is done for partial or full tears of your rotator cuff  Your healthcare provider will clean away damaged tissue and fix your tear  ¨ Tendon transfer and graft: This surgery is done for badly torn rotator cuffs that cannot be repaired easily  Your healthcare provider will use a piece of another tissue or muscle to fix the torn tendon  ¨ Arthrodesis: This surgery is to reshape the bone of your shoulder joint so it stays in place  This is done if the muscles of your rotator cuff are not working  ¨ Arthroplasty:  During this surgery, an artificial joint made of metal and plastic is put into your shoulder  This surgery may be done if the rotator cuff cannot be fixed, or if your pain and swelling do not go away  How can I care for my rotator cuff injury at home? · Rest:  Rest may help your shoulder heal  Overuse of your shoulder can make your injury worse  Avoid heavy lifting, using your arms over your head, or any other activity that makes the pain worse  · Put ice or heat on your shoulder:  Use ice on your shoulder every few hours for the first several days  This may help decrease pain and swelling   After the first several days, a heating pad may help relax the muscles in your shoulder  When should I contact my healthcare provider? · The pain in your shoulder or arm is not improving, or is worse than before you started treatment  · You have new pain in your neck  · You have a fever  · You have questions or concerns about your condition or care  When should I seek immediate care or call 911? · You suddenly cannot move your arm  · You have severe stomach or back pain, are vomiting blood, or have black bowel movements  CARE AGREEMENT:   You have the right to help plan your care  Learn about your health condition and how it may be treated  Discuss treatment options with your caregivers to decide what care you want to receive  You always have the right to refuse treatment  The above information is an  only  It is not intended as medical advice for individual conditions or treatments  Talk to your doctor, nurse or pharmacist before following any medical regimen to see if it is safe and effective for you  © 2017 [employer]  Information is for End User's use only and may not be sold, redistributed or otherwise used for commercial purposes  All illustrations and images included in CareNotes® are the copyrighted property of [employer]  or [employer] Ceb[employer]

## 2019-11-18 NOTE — PROGRESS NOTES
Assessment/Plan:    Diagnoses and all orders for this visit:    Shoulder strain, left, initial encounter  -     Large joint arthrocentesis: L subacromial bursa    Acute pain of left shoulder  -     Ambulatory referral to Orthopedic Surgery  -     Large joint arthrocentesis: L subacromial bursa    Primary osteoarthritis of left shoulder     Patient has sustained a left shoulder injury occurring approximately 3 months ago after he fell out of bed  Is possible that he does have a significant rotator cuff tear of the left shoulder  We have discussed the evaluation options and treatment options however patient is not interested in shoulder surgery and states he is willing to live with the pain if it comes to this  After discussion patient has elected to undergo steroid injection of the left shoulder to help his pain is especially his nighttime pain which has been interfering with sleep  We did discuss the potential side effects of the steroid medications including waking the rotator cuff and possibly enlarging this  Immediately after injection patient did experience improved pain levels with improved range of motion particularly internal rotation  I instructed him to continue the shoulder strengthening exercises we will see him in about 4-6 weeks  Will hold off on MRI at this time  Return in about 4 weeks (around 12/16/2019)  Chief Complaint:     Chief Complaint   Patient presents with    Left Shoulder - Pain       Subjective:   Patient ID: Madhav Leonard is a 69 y o  male  NP presents for approximately 3 months of left shoulder occurring after he fell out of bed on an outstretched arm  Since that time he's notice pain, + night time pain and decreased ROM with weakness  He was evaluated by PCP and referred to PT for which he has completed approximately 6 weeks  He does note improved range of motion and strength however still with pain    Pain is diffuse and mostly lateral, worse with movement of shoulder, no benefit from Aleve  He did note significant bruising of the upper extremity anterior and medial       Review of Systems   Constitutional: Negative for chills and fever  HENT: Negative for sore throat and trouble swallowing  Eyes: Negative for pain and discharge  Respiratory: Negative for choking and shortness of breath  Cardiovascular: Negative for chest pain and palpitations  Gastrointestinal: Negative for abdominal pain and vomiting  Endocrine: Negative for cold intolerance and heat intolerance  Musculoskeletal: Positive for arthralgias  Neurological: Negative for dizziness and weakness  Psychiatric/Behavioral: Negative for self-injury and suicidal ideas  The following portions of the patient's chart were reviewed and updated as appropriate:    Allergy:  No Known Allergies      Past Medical History:   Diagnosis Date    Abnormal weight gain     Benign essential hypertension     Carotid artery occlusion     Chronic ischemic heart disease     COPD (chronic obstructive pulmonary disease) (HCC)     Coronary artery disease     Essential hypertension     Hyperlipemia     Hypertension     Obesity with body mass index 30 or greater     Osteoarthritis     Sciatica     Vitamin D deficiency        Past Surgical History:   Procedure Laterality Date    ANGIOPLASTY      APPENDECTOMY         Social History     Socioeconomic History    Marital status: /Civil Union     Spouse name: Not on file    Number of children: Not on file    Years of education: Not on file    Highest education level: Not on file   Occupational History    Occupation:     Social Needs    Financial resource strain: Not on file    Food insecurity:     Worry: Not on file     Inability: Not on file   QVPN needs:     Medical: Not on file     Non-medical: Not on file   Tobacco Use    Smoking status: Former Smoker     Last attempt to quit: 2012     Years since quittin 7  Smokeless tobacco: Never Used   Substance and Sexual Activity    Alcohol use: Never     Frequency: Never    Drug use: Never    Sexual activity: Not on file   Lifestyle    Physical activity:     Days per week: Not on file     Minutes per session: Not on file    Stress: Not on file   Relationships    Social connections:     Talks on phone: Not on file     Gets together: Not on file     Attends Zoroastrian service: Not on file     Active member of club or organization: Not on file     Attends meetings of clubs or organizations: Not on file     Relationship status: Not on file    Intimate partner violence:     Fear of current or ex partner: Not on file     Emotionally abused: Not on file     Physically abused: Not on file     Forced sexual activity: Not on file   Other Topics Concern    Not on file   Social History Narrative    Consumes on average 2 cups of regular coffee per day    Consumes on average 2 cups of hot tea per day        Family History   Problem Relation Age of Onset    Lung cancer Mother     Alzheimer's disease Mother     Heart attack Father        Medications:    Current Outpatient Medications:     ALPRAZolam (XANAX) 0 25 mg tablet, Take by mouth, Disp: , Rfl:     clopidogrel (PLAVIX) 75 mg tablet, Take by mouth, Disp: , Rfl:     diclofenac sodium (VOLTAREN) 1 %, Place on the skin, Disp: , Rfl:     ezetimibe (ZETIA) 10 mg tablet, Take by mouth, Disp: , Rfl:     isosorbide mononitrate (IMDUR) 30 mg 24 hr tablet, Take by mouth, Disp: , Rfl:     lisinopril (ZESTRIL) 10 mg tablet, , Disp: , Rfl:     traMADol (ULTRAM) 50 mg tablet, Take 1 tablet (50 mg total) by mouth daily at bedtime, Disp: 30 tablet, Rfl: 0    Patient Active Problem List   Diagnosis    Shoulder pain, left    Ischemic cardiomyopathy    Encounter for prostate cancer screening       Objective:  /90 (BP Location: Right arm, Patient Position: Sitting, Cuff Size: Large)   Pulse 84   Resp 16   Ht 5' 6" (1 676 m) Wt 82 5 kg (181 lb 12 8 oz)   BMI 29 34 kg/m²     Right Shoulder Exam     Range of Motion   Active abduction: normal   External rotation: normal   Internal rotation 0 degrees: normal     Muscle Strength   External rotation: 5/5   Supraspinatus: 5/5       Left Shoulder Exam     Range of Motion   Active abduction: abnormal   External rotation: normal   Internal rotation 0 degrees: abnormal     Muscle Strength   External rotation: 5/5   Supraspinatus: 4/5     Tests   Drop arm: negative    Other   Erythema: absent  Sensation: normal  Pulse: present             Physical Exam   Constitutional: He is oriented to person, place, and time  He appears well-developed and well-nourished  HENT:   Head: Normocephalic and atraumatic  Eyes: Conjunctivae are normal    Neck: Neck supple  Pulmonary/Chest: Effort normal    Neurological: He is alert and oriented to person, place, and time  Skin: Skin is warm and dry  Psychiatric: He has a normal mood and affect  His behavior is normal    Vitals reviewed  Neurologic Exam     Mental Status   Oriented to person, place, and time  Large joint arthrocentesis: L subacromial bursa  Date/Time: 11/18/2019 2:05 PM  Consent given by: patient  Site marked: site marked  Timeout: Immediately prior to procedure a time out was called to verify the correct patient, procedure, equipment, support staff and site/side marked as required   Supporting Documentation  Indications: pain   Procedure Details  Location: shoulder - L subacromial bursa (left GHJ)  Preparation: Patient was prepped and draped in the usual sterile fashion  Needle size: 22 G  Ultrasound guidance: no  Approach: anterolateral  Medications administered: 4 mL lidocaine 1 %; 40 mg triamcinolone acetonide 40 mg/mL    Patient tolerance: patient tolerated the procedure well with no immediate complications  Dressing:  Sterile dressing applied          I have personally reviewed the written report of the pertinent studies  LEFT SHOULDER     INDICATION:   [id number] [id number]: Pain in left shoulder      COMPARISON:  None     VIEWS:  XR SHOULDER 2+ VW LEFT   Images: 3     FINDINGS:     There is no acute fracture or dislocation      Mild degenerative changes at the a c   and glenohumeral joints      No lytic or blastic lesions are seen      Soft tissues are unremarkable      IMPRESSION:     No acute osseous abnormality    Mild degenerative changes

## 2019-11-19 ENCOUNTER — APPOINTMENT (OUTPATIENT)
Dept: PHYSICAL THERAPY | Facility: CLINIC | Age: 74
End: 2019-11-19
Payer: MEDICARE

## 2019-11-19 ENCOUNTER — APPOINTMENT (OUTPATIENT)
Dept: LAB | Facility: CLINIC | Age: 74
End: 2019-11-19
Payer: MEDICARE

## 2019-11-19 DIAGNOSIS — I25.5 ISCHEMIC CARDIOMYOPATHY: ICD-10-CM

## 2019-11-19 DIAGNOSIS — E78.00 PURE HYPERCHOLESTEROLEMIA: ICD-10-CM

## 2019-11-19 DIAGNOSIS — I10 ESSENTIAL HYPERTENSION: ICD-10-CM

## 2019-11-19 LAB
ALBUMIN SERPL BCP-MCNC: 4.1 G/DL (ref 3.5–5)
ALP SERPL-CCNC: 74 U/L (ref 46–116)
ALT SERPL W P-5'-P-CCNC: 15 U/L (ref 12–78)
ANION GAP SERPL CALCULATED.3IONS-SCNC: 5 MMOL/L (ref 4–13)
AST SERPL W P-5'-P-CCNC: 9 U/L (ref 5–45)
BASOPHILS # BLD AUTO: 0.02 THOUSANDS/ΜL (ref 0–0.1)
BASOPHILS NFR BLD AUTO: 0 % (ref 0–1)
BILIRUB SERPL-MCNC: 0.53 MG/DL (ref 0.2–1)
BUN SERPL-MCNC: 27 MG/DL (ref 5–25)
CALCIUM SERPL-MCNC: 9.4 MG/DL (ref 8.3–10.1)
CHLORIDE SERPL-SCNC: 109 MMOL/L (ref 100–108)
CO2 SERPL-SCNC: 25 MMOL/L (ref 21–32)
CREAT SERPL-MCNC: 1.08 MG/DL (ref 0.6–1.3)
EOSINOPHIL # BLD AUTO: 0 THOUSAND/ΜL (ref 0–0.61)
EOSINOPHIL NFR BLD AUTO: 0 % (ref 0–6)
ERYTHROCYTE [DISTWIDTH] IN BLOOD BY AUTOMATED COUNT: 12.9 % (ref 11.6–15.1)
GFR SERPL CREATININE-BSD FRML MDRD: 68 ML/MIN/1.73SQ M
GLUCOSE P FAST SERPL-MCNC: 101 MG/DL (ref 65–99)
HCT VFR BLD AUTO: 46 % (ref 36.5–49.3)
HGB BLD-MCNC: 15.2 G/DL (ref 12–17)
IMM GRANULOCYTES # BLD AUTO: 0.05 THOUSAND/UL (ref 0–0.2)
IMM GRANULOCYTES NFR BLD AUTO: 0 % (ref 0–2)
LDLC SERPL DIRECT ASSAY-MCNC: 85 MG/DL (ref 0–100)
LYMPHOCYTES # BLD AUTO: 1.4 THOUSANDS/ΜL (ref 0.6–4.47)
LYMPHOCYTES NFR BLD AUTO: 11 % (ref 14–44)
MCH RBC QN AUTO: 32.5 PG (ref 26.8–34.3)
MCHC RBC AUTO-ENTMCNC: 33 G/DL (ref 31.4–37.4)
MCV RBC AUTO: 98 FL (ref 82–98)
MONOCYTES # BLD AUTO: 1.08 THOUSAND/ΜL (ref 0.17–1.22)
MONOCYTES NFR BLD AUTO: 8 % (ref 4–12)
NEUTROPHILS # BLD AUTO: 10.26 THOUSANDS/ΜL (ref 1.85–7.62)
NEUTS SEG NFR BLD AUTO: 81 % (ref 43–75)
NRBC BLD AUTO-RTO: 0 /100 WBCS
PLATELET # BLD AUTO: 173 THOUSANDS/UL (ref 149–390)
PMV BLD AUTO: 12.3 FL (ref 8.9–12.7)
POTASSIUM SERPL-SCNC: 4.4 MMOL/L (ref 3.5–5.3)
PROT SERPL-MCNC: 8.1 G/DL (ref 6.4–8.2)
RBC # BLD AUTO: 4.68 MILLION/UL (ref 3.88–5.62)
SODIUM SERPL-SCNC: 139 MMOL/L (ref 136–145)
TRIGL SERPL-MCNC: 112 MG/DL
WBC # BLD AUTO: 12.81 THOUSAND/UL (ref 4.31–10.16)

## 2019-11-19 PROCEDURE — 83721 ASSAY OF BLOOD LIPOPROTEIN: CPT

## 2019-11-19 PROCEDURE — 36415 COLL VENOUS BLD VENIPUNCTURE: CPT

## 2019-11-19 PROCEDURE — 84478 ASSAY OF TRIGLYCERIDES: CPT

## 2019-11-19 PROCEDURE — 85025 COMPLETE CBC W/AUTO DIFF WBC: CPT

## 2019-11-19 PROCEDURE — 80053 COMPREHEN METABOLIC PANEL: CPT

## 2019-11-21 ENCOUNTER — APPOINTMENT (OUTPATIENT)
Dept: PHYSICAL THERAPY | Facility: CLINIC | Age: 74
End: 2019-11-21
Payer: MEDICARE

## 2019-11-22 ENCOUNTER — TRANSCRIBE ORDERS (OUTPATIENT)
Dept: PHYSICAL THERAPY | Facility: CLINIC | Age: 74
End: 2019-11-22

## 2019-11-22 DIAGNOSIS — M25.512 ACUTE PAIN OF LEFT SHOULDER: Primary | ICD-10-CM

## 2019-11-25 ENCOUNTER — APPOINTMENT (OUTPATIENT)
Dept: PHYSICAL THERAPY | Facility: CLINIC | Age: 74
End: 2019-11-25
Payer: MEDICARE

## 2019-11-25 DIAGNOSIS — I73.9 PERIPHERAL VASCULAR DISEASE (HCC): ICD-10-CM

## 2019-11-25 DIAGNOSIS — I10 ESSENTIAL HYPERTENSION: Primary | ICD-10-CM

## 2019-11-25 RX ORDER — LISINOPRIL 10 MG/1
10 TABLET ORAL DAILY
Qty: 90 TABLET | Refills: 0 | Status: SHIPPED | OUTPATIENT
Start: 2019-11-25 | End: 2020-02-25 | Stop reason: SDUPTHER

## 2019-11-25 RX ORDER — CLOPIDOGREL BISULFATE 75 MG/1
75 TABLET ORAL DAILY
Qty: 90 TABLET | Refills: 0 | Status: SHIPPED | OUTPATIENT
Start: 2019-11-25 | End: 2020-02-25 | Stop reason: SDUPTHER

## 2019-11-27 ENCOUNTER — APPOINTMENT (OUTPATIENT)
Dept: PHYSICAL THERAPY | Facility: CLINIC | Age: 74
End: 2019-11-27
Payer: MEDICARE

## 2019-12-03 NOTE — PROGRESS NOTES
Spoke with patient on phone  Patient had injection to shoulder and is feeling much better  He reports he does not feel he needs any more PT at this time and was appreciative for the help PT provided  DC episode of care at this time

## 2020-02-25 DIAGNOSIS — I73.9 PERIPHERAL VASCULAR DISEASE (HCC): ICD-10-CM

## 2020-02-25 DIAGNOSIS — I10 ESSENTIAL HYPERTENSION: ICD-10-CM

## 2020-02-25 RX ORDER — CLOPIDOGREL BISULFATE 75 MG/1
75 TABLET ORAL DAILY
Qty: 90 TABLET | Refills: 0 | Status: SHIPPED | OUTPATIENT
Start: 2020-02-25

## 2020-02-25 RX ORDER — ROSUVASTATIN CALCIUM 5 MG/1
20 TABLET, COATED ORAL DAILY
Qty: 90 TABLET | Refills: 3 | Status: SHIPPED | OUTPATIENT
Start: 2020-02-25 | End: 2021-04-05

## 2020-02-25 RX ORDER — METOPROLOL TARTRATE 50 MG/1
50 TABLET, FILM COATED ORAL EVERY 12 HOURS SCHEDULED
Qty: 180 TABLET | Refills: 3 | Status: SHIPPED | OUTPATIENT
Start: 2020-02-25

## 2020-02-25 RX ORDER — METOPROLOL TARTRATE 50 MG/1
TABLET, FILM COATED ORAL
COMMUNITY
Start: 2012-03-13 | End: 2020-02-25 | Stop reason: SDUPTHER

## 2020-02-25 RX ORDER — ROSUVASTATIN CALCIUM 5 MG/1
TABLET, COATED ORAL
COMMUNITY
End: 2020-02-25 | Stop reason: SDUPTHER

## 2020-02-25 RX ORDER — LISINOPRIL 10 MG/1
10 TABLET ORAL DAILY
Qty: 90 TABLET | Refills: 0 | Status: SHIPPED | OUTPATIENT
Start: 2020-02-25 | End: 2020-09-28

## 2020-09-28 DIAGNOSIS — I10 ESSENTIAL HYPERTENSION: ICD-10-CM

## 2020-09-28 RX ORDER — LISINOPRIL 10 MG/1
TABLET ORAL
Qty: 90 TABLET | Refills: 0 | Status: SHIPPED | OUTPATIENT
Start: 2020-09-28

## 2021-04-05 DIAGNOSIS — I73.9 PERIPHERAL VASCULAR DISEASE (HCC): ICD-10-CM

## 2021-04-05 DIAGNOSIS — I10 ESSENTIAL HYPERTENSION: ICD-10-CM

## 2021-04-05 RX ORDER — ROSUVASTATIN CALCIUM 20 MG/1
TABLET, COATED ORAL
Qty: 90 TABLET | Refills: 0 | Status: SHIPPED | OUTPATIENT
Start: 2021-04-05 | End: 2021-07-09

## 2021-04-20 ENCOUNTER — IMMUNIZATIONS (OUTPATIENT)
Dept: FAMILY MEDICINE CLINIC | Facility: HOSPITAL | Age: 76
End: 2021-04-20

## 2021-04-20 DIAGNOSIS — Z23 ENCOUNTER FOR IMMUNIZATION: Primary | ICD-10-CM

## 2021-04-20 PROCEDURE — 91301 SARS-COV-2 / COVID-19 MRNA VACCINE (MODERNA) 100 MCG: CPT

## 2021-04-20 PROCEDURE — 0011A SARS-COV-2 / COVID-19 MRNA VACCINE (MODERNA) 100 MCG: CPT

## 2021-05-18 ENCOUNTER — IMMUNIZATIONS (OUTPATIENT)
Dept: FAMILY MEDICINE CLINIC | Facility: HOSPITAL | Age: 76
End: 2021-05-18

## 2021-05-18 DIAGNOSIS — Z23 ENCOUNTER FOR IMMUNIZATION: Primary | ICD-10-CM

## 2021-05-18 PROCEDURE — 91301 SARS-COV-2 / COVID-19 MRNA VACCINE (MODERNA) 100 MCG: CPT

## 2021-05-18 PROCEDURE — 0012A SARS-COV-2 / COVID-19 MRNA VACCINE (MODERNA) 100 MCG: CPT

## 2021-07-06 DIAGNOSIS — I73.9 PERIPHERAL VASCULAR DISEASE (HCC): ICD-10-CM

## 2021-07-06 DIAGNOSIS — I10 ESSENTIAL HYPERTENSION: ICD-10-CM

## 2021-07-09 RX ORDER — ROSUVASTATIN CALCIUM 20 MG/1
TABLET, COATED ORAL
Qty: 90 TABLET | Refills: 0 | Status: SHIPPED | OUTPATIENT
Start: 2021-07-09